# Patient Record
Sex: MALE | Race: WHITE | NOT HISPANIC OR LATINO
[De-identification: names, ages, dates, MRNs, and addresses within clinical notes are randomized per-mention and may not be internally consistent; named-entity substitution may affect disease eponyms.]

---

## 2017-02-01 ENCOUNTER — APPOINTMENT (OUTPATIENT)
Dept: INTERNAL MEDICINE | Facility: CLINIC | Age: 47
End: 2017-02-01

## 2017-02-07 ENCOUNTER — RESULT REVIEW (OUTPATIENT)
Age: 47
End: 2017-02-07

## 2017-02-08 ENCOUNTER — APPOINTMENT (OUTPATIENT)
Dept: DERMATOLOGY | Facility: CLINIC | Age: 47
End: 2017-02-08

## 2018-07-04 ENCOUNTER — TRANSCRIPTION ENCOUNTER (OUTPATIENT)
Age: 48
End: 2018-07-04

## 2020-07-09 ENCOUNTER — APPOINTMENT (OUTPATIENT)
Dept: INTERNAL MEDICINE | Facility: CLINIC | Age: 50
End: 2020-07-09
Payer: COMMERCIAL

## 2020-07-09 VITALS — HEART RATE: 89 BPM | WEIGHT: 272 LBS | BODY MASS INDEX: 40.75 KG/M2 | TEMPERATURE: 97.7 F | HEIGHT: 68.5 IN

## 2020-07-09 VITALS
SYSTOLIC BLOOD PRESSURE: 125 MMHG | DIASTOLIC BLOOD PRESSURE: 85 MMHG | HEART RATE: 89 BPM | TEMPERATURE: 97.7 F | WEIGHT: 272 LBS | BODY MASS INDEX: 40.76 KG/M2

## 2020-07-09 PROCEDURE — 99202 OFFICE O/P NEW SF 15 MIN: CPT

## 2020-07-21 ENCOUNTER — APPOINTMENT (OUTPATIENT)
Dept: POPULATION HEALTH | Facility: CLINIC | Age: 50
End: 2020-07-21
Payer: OTHER MISCELLANEOUS

## 2020-07-21 VITALS
DIASTOLIC BLOOD PRESSURE: 100 MMHG | WEIGHT: 272 LBS | BODY MASS INDEX: 41.22 KG/M2 | RESPIRATION RATE: 14 BRPM | HEIGHT: 68 IN | SYSTOLIC BLOOD PRESSURE: 139 MMHG | OXYGEN SATURATION: 98 % | TEMPERATURE: 98.4 F | HEART RATE: 68 BPM

## 2020-07-21 PROCEDURE — 99203 OFFICE O/P NEW LOW 30 MIN: CPT

## 2020-07-21 NOTE — ASSESSMENT
[FreeTextEntry1] : Recommend MRI left hip/pelvis to further evaluate, consider orthopedic evaluation/PT. Patient will return to office for physical as he is overdue\par \par Dr. Burch was present in office building while I examined patient\par

## 2020-07-21 NOTE — ADDENDUM
[FreeTextEntry1] : MRI of the pelvis/hip denied by insurance\par Changed to x-ray the left hip and pelvic sonogram\par \par Patient called on 7/20 stating he is now seeing a Worker's Comp. doctor who will handle testing

## 2020-07-21 NOTE — ASSESSMENT
[FreeTextEntry1] : 50 year old man with right low back pain and left iliopsoas strain after fall at work\par \par Plan:  conservative management with physical therapy \par pt does not want RX for pain management at this point in time.\par instructed pt in posture exercises and safe lifting techniques\par \par % impairment - 15% impairment - may work as tolerated when not having pain, using safe lifting techniques.  rest when having pain.\par

## 2020-07-21 NOTE — HISTORY OF PRESENT ILLNESS
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  He was pulling a skid full of about 30 boxes of tiles and he stepped backwards with his left leg and tripped on a wire - he felt his left groin pull out and he fell backwards, landing on the right side of his back.\par \par He had some pain in the left groin and right sdie of his back but resumed working and finished out the day.  The pain continued for about a week but then resolved.  He had no pain for about 2 weeks, but then the pain returned after working.  he has had intermittent pain on and off since that time, over the last several months.  \par \par Left groin - the pain comes and goes, usually walking is what triggers the pain, which improves with rest, alieve helps a bit.  does not radiate.  worse with walking.\par \par Back doesn’t hurt while working, but at the end of the day when he gets home he feels a stabbing pain 10/10 that goes on all night.  It feels better in the morning when he wakes up.  Pain is on the right side of lower back, does not radiate anywhere.\par \par Denies numbness or tingling in either leg\par \par Denies past history of any injuries.\par \par

## 2020-07-21 NOTE — HISTORY OF PRESENT ILLNESS
[FreeTextEntry8] : 50-year-old male presents with new/old to practice, last seen in 2016, no change in health/hospitalizations since last check here\par Medical history includes\par -Hypertension, dietarily controlled\par -Hyperlipidemia\par -Obesity\par -ARACELI on CPAP\par patient is on no medication\par \par Presents complaining of ?WCC.\par Patient states about 3 years ago in approximately November an object/material was falling at his job and he stopped it with his leg and jammed his left leg on a BX cable and did" a split". Patient states since then he has had left groin discomfort on and off and has had good days and bad days. Patient states" I feel as though hip is dislocating". Patient has never addressed this issue,has not had imaging done. Patient is taking Advil with muscle relaxer p.r.n. Patient is still awaiting to hear from his employer if this is a workman's comp case or not

## 2020-07-21 NOTE — PHYSICAL EXAM
[General Appearance - Alert] : alert [General Appearance - In No Acute Distress] : in no acute distress [Normal Kyphosis] : normal kyphosis [No Visual Abnormalities] : no visible abnormalities [No Scoliosis] : no scoliosis [Normal Lordosis] : normal lordosis [Full ROM] : full ROM [No Tenderness to Palpation] : no spine tenderness on palpation [No Masses] : no masses [No Pain with ROM] : no pain with motion in any direction [Intact] : all sensory within normal limits bilaterally [Shuffling] : shuffling [Motor Strength Lower Extremities Right] : strength was normal in the right lower extremity [Motor Strength Ankle Right Weakness] : normal ankle strength [Motor Strength Hips Right Weakness] : normal hip strength [5] : hip internal rotation 5/5 [Motor Strength Knee Left Weakness] : normal knee strength [4] : hip external rotation 4/5 [Motor Strength Ankle Left Weakness] : normal ankle strength [Skin Color & Pigmentation] : normal skin color and pigmentation [Skin Turgor] : normal skin turgor [Sensation] : the sensory exam was normal to light touch and pinprick [Motor Exam] : the motor exam was normal [] : no rash [No Focal Deficits] : no focal deficits

## 2020-07-21 NOTE — PHYSICAL EXAM
[No Respiratory Distress] : no respiratory distress  [No Acute Distress] : no acute distress [Normal Rate] : normal rate  [Clear to Auscultation] : lungs were clear to auscultation bilaterally [Regular Rhythm] : with a regular rhythm [Normal Affect] : the affect was normal [Normal Mood] : the mood was normal [de-identified] : +Discomfort left groin with movement of left leg especially straightening it, left hip with mild crepitus,+ pain with internal and external rotation, negative straight leg raise, no inguinal nodes/hernias noted

## 2020-08-19 ENCOUNTER — APPOINTMENT (OUTPATIENT)
Dept: POPULATION HEALTH | Facility: CLINIC | Age: 50
End: 2020-08-19
Payer: OTHER MISCELLANEOUS

## 2020-08-19 PROCEDURE — 99447 NTRPROF PH1/NTRNET/EHR 11-20: CPT

## 2020-08-19 NOTE — ASSESSMENT
[FreeTextEntry1] : 50 year old man with right low back pain and left iliopsoas strain after fall at work\par \par Plan: continue conservative management with physical therapy \par pt does not want RX for pain management at this point in time.\par instructed pt in posture exercises and safe lifting techniques\par \par % impairment - 15% impairment - may work as tolerated when not having pain, using safe lifting techniques. rest when having pain.\par

## 2020-08-19 NOTE — HISTORY OF PRESENT ILLNESS
[FreeTextEntry1] : 50 year old man here for evaluation of left groin pain and right lower back pain.\par \par DOI: 1/3/2020\par \par INTERVAL HISTORY:\par Feeling a bit better - groin hurts sometimes but not as much as before\par back is only painful about once a week, as opposed to every day.\par \par Has done PT for about a month and its helping.\par does heat therapy, passive stretching; also doing stretches at home.\par also doing strengthening exercises in the weight room. \par

## 2020-08-19 NOTE — REASON FOR VISIT
[Other Location: e.g. School (Enter Location, City,State)___] : at [unfilled], at the time of the visit. [Medical Office: ([facility])___] : at the medical office located in  [Verbal consent obtained from patient] : the patient, [unfilled]

## 2020-09-22 ENCOUNTER — APPOINTMENT (OUTPATIENT)
Dept: POPULATION HEALTH | Facility: CLINIC | Age: 50
End: 2020-09-22
Payer: OTHER MISCELLANEOUS

## 2020-09-22 VITALS
SYSTOLIC BLOOD PRESSURE: 134 MMHG | HEART RATE: 80 BPM | TEMPERATURE: 95.9 F | WEIGHT: 272 LBS | DIASTOLIC BLOOD PRESSURE: 80 MMHG | HEIGHT: 68 IN | BODY MASS INDEX: 41.22 KG/M2 | OXYGEN SATURATION: 98 %

## 2020-09-22 DIAGNOSIS — R10.32 LEFT LOWER QUADRANT PAIN: ICD-10-CM

## 2020-09-22 PROCEDURE — 99213 OFFICE O/P EST LOW 20 MIN: CPT

## 2020-09-22 NOTE — HISTORY OF PRESENT ILLNESS
[FreeTextEntry1] : 50 year old man here for evaluation of left groin pain and right lower back pain.\par \par DOI: 1/3/2020\par \par INTERVAL HISTORY:\par Pain is completely resolved.\par Finished PT about a week and a half ago, still doing exercises and stretches at home.\par Feeling great, has no complaints

## 2020-09-22 NOTE — PHYSICAL EXAM
[General Appearance - Alert] : alert [General Appearance - In No Acute Distress] : in no acute distress [Normal Kyphosis] : normal kyphosis [No Visual Abnormalities] : no visible abnormalities [Normal Lordosis] : normal lordosis [No Scoliosis] : no scoliosis [No Tenderness to Palpation] : no spine tenderness on palpation [No Masses] : no masses [Full ROM] : full ROM [No Pain with ROM] : no pain with motion in any direction [Intact] : all reflexes within normal limits bilaterally [Normal Station and Gait] : the gait and station were normal [Normal] : motor strength was normal in all muscle groups [Normal Motor Tone] : the muscle tone was normal [Involuntary Movements] : no involuntary movements were seen

## 2020-09-22 NOTE — ASSESSMENT
[FreeTextEntry1] : 49 yo man with back and groin pain, fully resolved after successful course of PT.\par \par Plan: continue exercise and stretching\par counselled on daily exercise and resistance training twice a week to maintain fitness\par \par 0% impairment\par may return to work with no restrictions

## 2021-08-03 ENCOUNTER — APPOINTMENT (OUTPATIENT)
Dept: INTERNAL MEDICINE | Facility: CLINIC | Age: 51
End: 2021-08-03
Payer: COMMERCIAL

## 2021-08-03 VITALS — SYSTOLIC BLOOD PRESSURE: 145 MMHG | DIASTOLIC BLOOD PRESSURE: 94 MMHG

## 2021-08-03 VITALS
RESPIRATION RATE: 14 BRPM | HEIGHT: 68 IN | WEIGHT: 277 LBS | BODY MASS INDEX: 41.98 KG/M2 | TEMPERATURE: 98 F | SYSTOLIC BLOOD PRESSURE: 150 MMHG | HEART RATE: 83 BPM | DIASTOLIC BLOOD PRESSURE: 95 MMHG

## 2021-08-03 PROCEDURE — 36415 COLL VENOUS BLD VENIPUNCTURE: CPT

## 2021-08-03 PROCEDURE — 99214 OFFICE O/P EST MOD 30 MIN: CPT | Mod: 25

## 2021-08-03 NOTE — ASSESSMENT
[FreeTextEntry1] : Patient with long history of hypertension which has not been treated with patient declining stating he would ttempt to treat with lifestyle changes which have not occurred.\par \par patient's blood pressure now higher and strongly recommended he be treated which she reluctantly agrees to do.\par Therefore start olmesartan 20 mg daily.\par \par Patient also with history of sleep apnea which is untreated therefore given referral to followup with sleep specialists.\par \par Venipuncture done today in the office to check metabolic status.\par \par followup in 3 weeks to recheck blood pressure and BMP.\par Also schedule appointment for 3 months for yearly physical.\par \par followup with Worker's Comp. physician for continued groin pain.

## 2021-08-03 NOTE — HISTORY OF PRESENT ILLNESS
[FreeTextEntry8] : The patient scheduled an acute appointment secondary to left groin pain which she has had an is a Worker's Comp. case therefore patient told he needs to followup with his Worker's Comp. physician.\par \par The patient does have a history of hypertension which he was attempting to control with lifestyle changes which have not occurred.\par The patient was last seen about a year ago with mildly elevated blood pressure but he never followed up.\par No chest pain or shortness of breath.

## 2021-08-03 NOTE — PHYSICAL EXAM
[No Acute Distress] : no acute distress [Well-Appearing] : well-appearing [No Respiratory Distress] : no respiratory distress  [No Accessory Muscle Use] : no accessory muscle use [Clear to Auscultation] : lungs were clear to auscultation bilaterally [Normal Rate] : normal rate  [Regular Rhythm] : with a regular rhythm [No Edema] : there was no peripheral edema [No Focal Deficits] : no focal deficits [Normal Affect] : the affect was normal

## 2021-08-04 ENCOUNTER — NON-APPOINTMENT (OUTPATIENT)
Age: 51
End: 2021-08-04

## 2021-08-04 LAB
ALBUMIN SERPL ELPH-MCNC: 4.7 G/DL
ALP BLD-CCNC: 155 U/L
ALT SERPL-CCNC: 27 U/L
ANION GAP SERPL CALC-SCNC: 14 MMOL/L
AST SERPL-CCNC: 17 U/L
BASOPHILS # BLD AUTO: 0.04 K/UL
BASOPHILS NFR BLD AUTO: 0.4 %
BILIRUB SERPL-MCNC: 0.4 MG/DL
BUN SERPL-MCNC: 15 MG/DL
CALCIUM SERPL-MCNC: 9.8 MG/DL
CHLORIDE SERPL-SCNC: 101 MMOL/L
CHOLEST SERPL-MCNC: 183 MG/DL
CO2 SERPL-SCNC: 26 MMOL/L
CREAT SERPL-MCNC: 0.94 MG/DL
EOSINOPHIL # BLD AUTO: 0.26 K/UL
EOSINOPHIL NFR BLD AUTO: 2.6 %
ESTIMATED AVERAGE GLUCOSE: 183 MG/DL
GLUCOSE SERPL-MCNC: 173 MG/DL
HBA1C MFR BLD HPLC: 8 %
HCT VFR BLD CALC: 47.6 %
HDLC SERPL-MCNC: 48 MG/DL
HGB BLD-MCNC: 15.6 G/DL
IMM GRANULOCYTES NFR BLD AUTO: 0.1 %
LDLC SERPL CALC-MCNC: 109 MG/DL
LYMPHOCYTES # BLD AUTO: 2.3 K/UL
LYMPHOCYTES NFR BLD AUTO: 23.4 %
MAN DIFF?: NORMAL
MCHC RBC-ENTMCNC: 28 PG
MCHC RBC-ENTMCNC: 32.8 GM/DL
MCV RBC AUTO: 85.3 FL
MONOCYTES # BLD AUTO: 0.68 K/UL
MONOCYTES NFR BLD AUTO: 6.9 %
NEUTROPHILS # BLD AUTO: 6.56 K/UL
NEUTROPHILS NFR BLD AUTO: 66.6 %
NONHDLC SERPL-MCNC: 135 MG/DL
PLATELET # BLD AUTO: 232 K/UL
POTASSIUM SERPL-SCNC: 4.9 MMOL/L
PROT SERPL-MCNC: 7.4 G/DL
RBC # BLD: 5.58 M/UL
RBC # FLD: 13.3 %
SODIUM SERPL-SCNC: 141 MMOL/L
TRIGL SERPL-MCNC: 127 MG/DL
TSH SERPL-ACNC: 2.84 UIU/ML
WBC # FLD AUTO: 9.85 K/UL

## 2021-08-09 ENCOUNTER — NON-APPOINTMENT (OUTPATIENT)
Age: 51
End: 2021-08-09

## 2021-08-09 ENCOUNTER — APPOINTMENT (OUTPATIENT)
Dept: PULMONOLOGY | Facility: CLINIC | Age: 51
End: 2021-08-09
Payer: COMMERCIAL

## 2021-08-09 VITALS
HEIGHT: 60 IN | HEART RATE: 88 BPM | DIASTOLIC BLOOD PRESSURE: 80 MMHG | RESPIRATION RATE: 16 BRPM | BODY MASS INDEX: 54.38 KG/M2 | SYSTOLIC BLOOD PRESSURE: 120 MMHG | WEIGHT: 277 LBS | OXYGEN SATURATION: 99 %

## 2021-08-09 PROCEDURE — 99204 OFFICE O/P NEW MOD 45 MIN: CPT

## 2021-08-09 NOTE — HISTORY OF PRESENT ILLNESS
[TextBox_4] : The patient was last seen in this office about 6 years ago. He has a history of obstructive sleep apnea diagnosed in 2013 2014. Even CPAP but did not do well with that. He's gained about 20 pounds since the diagnosis. He continues to report excessive sleepiness during the daytime. Loud snoring was noted. [ESS] : 7

## 2021-08-09 NOTE — ASSESSMENT
[FreeTextEntry1] : [place] with a remote history of obstructive sleep apnea probably persistent. I explained him that there have been advances in CPAP technology, mask fitting, humidification. A repeat home sleep study has been ordered. I will see him back after that and order new equipment for him.

## 2021-08-09 NOTE — PHYSICAL EXAM
[Low Lying Soft Palate] : low lying soft palate [Elongated Uvula] : elongated uvula [Enlarged Base of the Tongue] : enlarged base of the tongue [III] : Mallampati Class: III [Neck Circumference: ___] : neck circumference: [unfilled] [Normal Rate/Rhythm] : normal rate/rhythm [Normal S1, S2] : normal s1, s2 [No Resp Distress] : no resp distress [Clear to Auscultation Bilaterally] : clear to auscultation bilaterally [TextBox_2] : Obese

## 2021-08-22 ENCOUNTER — OUTPATIENT (OUTPATIENT)
Dept: OUTPATIENT SERVICES | Facility: HOSPITAL | Age: 51
LOS: 1 days | End: 2021-08-22
Payer: COMMERCIAL

## 2021-08-22 ENCOUNTER — APPOINTMENT (OUTPATIENT)
Age: 51
End: 2021-08-22
Payer: COMMERCIAL

## 2021-08-22 DIAGNOSIS — G47.33 OBSTRUCTIVE SLEEP APNEA (ADULT) (PEDIATRIC): ICD-10-CM

## 2021-08-22 PROCEDURE — 95806 SLEEP STUDY UNATT&RESP EFFT: CPT | Mod: 26

## 2021-08-22 PROCEDURE — 95806 SLEEP STUDY UNATT&RESP EFFT: CPT

## 2021-08-31 ENCOUNTER — APPOINTMENT (OUTPATIENT)
Dept: INTERNAL MEDICINE | Facility: CLINIC | Age: 51
End: 2021-08-31
Payer: COMMERCIAL

## 2021-08-31 VITALS
HEART RATE: 80 BPM | RESPIRATION RATE: 14 BRPM | SYSTOLIC BLOOD PRESSURE: 115 MMHG | DIASTOLIC BLOOD PRESSURE: 70 MMHG | HEIGHT: 68 IN | TEMPERATURE: 97.4 F | WEIGHT: 260 LBS | BODY MASS INDEX: 39.4 KG/M2

## 2021-08-31 DIAGNOSIS — Z80.0 FAMILY HISTORY OF MALIGNANT NEOPLASM OF DIGESTIVE ORGANS: ICD-10-CM

## 2021-08-31 PROCEDURE — 99213 OFFICE O/P EST LOW 20 MIN: CPT | Mod: 25

## 2021-08-31 PROCEDURE — 36415 COLL VENOUS BLD VENIPUNCTURE: CPT

## 2021-08-31 RX ORDER — LATANOPROST/PF 0.005 %
0.01 DROPS OPHTHALMIC (EYE)
Qty: 2 | Refills: 0 | Status: ACTIVE | COMMUNITY
Start: 2021-01-07

## 2021-08-31 NOTE — ASSESSMENT
[FreeTextEntry1] : Hypertension with good control with olmesartan 20 mg daily therefore will continue.\par No side effects.\par \par Discussed hyperglycemia with hemoglobin A1c at 8 with positive family history diabetes therefore patient likely a type II diabetic.\par He has already started lifestyle changes with improved diet and 17 pounds of weight loss.\par Encouraged to continue.\par \par Last blood work also showed mildly elevated alkaline phosphatase therefore will be repeated.\par \par Venipuncture done today in the office\par Followup in 3 months

## 2021-08-31 NOTE — HISTORY OF PRESENT ILLNESS
[FreeTextEntry1] : Followup hypertension new on medication and abnormal blood work [de-identified] : The patient presents for a followup visit having been seen on August 3, 2021 with elevated blood pressure therefore started treatment with olmesartan 20 mg daily.\par The patient is taking it without any side effects.\par \par Also blood work on that visit showed mild hyperlipidemia but hyperglycemia with hemoglobin A1c at 8. Therefore patient likely a type II diabetic.\par Family history includes father having had diabetes.\par Since his last visit the patient has made some excellent lifestyle changes mainly with dietary changes and has lost 17 pounds to

## 2021-09-09 LAB
ALBUMIN SERPL ELPH-MCNC: 4.9 G/DL
ALP BLD-CCNC: 98 U/L
ALT SERPL-CCNC: 49 U/L
ANION GAP SERPL CALC-SCNC: 13 MMOL/L
AST SERPL-CCNC: 30 U/L
BILIRUB DIRECT SERPL-MCNC: 0.1 MG/DL
BILIRUB INDIRECT SERPL-MCNC: 0.3 MG/DL
BILIRUB SERPL-MCNC: 0.4 MG/DL
BUN SERPL-MCNC: 20 MG/DL
CALCIUM SERPL-MCNC: 9.9 MG/DL
CHLORIDE SERPL-SCNC: 101 MMOL/L
CO2 SERPL-SCNC: 25 MMOL/L
CREAT SERPL-MCNC: 1.37 MG/DL
ESTIMATED AVERAGE GLUCOSE: 177 MG/DL
GLUCOSE SERPL-MCNC: 94 MG/DL
HBA1C MFR BLD HPLC: 7.8 %
POTASSIUM SERPL-SCNC: 4.6 MMOL/L
PROT SERPL-MCNC: 7.6 G/DL
SODIUM SERPL-SCNC: 139 MMOL/L

## 2021-09-15 LAB
ALBUMIN SERPL ELPH-MCNC: 4.7 G/DL
ALP BLD-CCNC: 104 U/L
ALT SERPL-CCNC: 28 U/L
ANION GAP SERPL CALC-SCNC: 18 MMOL/L
AST SERPL-CCNC: 20 U/L
BILIRUB DIRECT SERPL-MCNC: 0.2 MG/DL
BILIRUB INDIRECT SERPL-MCNC: 0.3 MG/DL
BILIRUB SERPL-MCNC: 0.4 MG/DL
BUN SERPL-MCNC: 15 MG/DL
CALCIUM SERPL-MCNC: 9.4 MG/DL
CHLORIDE SERPL-SCNC: 98 MMOL/L
CO2 SERPL-SCNC: 24 MMOL/L
CREAT SERPL-MCNC: 1.01 MG/DL
GLUCOSE SERPL-MCNC: 108 MG/DL
POTASSIUM SERPL-SCNC: 4.2 MMOL/L
PROT SERPL-MCNC: 7.1 G/DL
SODIUM SERPL-SCNC: 140 MMOL/L

## 2021-09-21 ENCOUNTER — NON-APPOINTMENT (OUTPATIENT)
Age: 51
End: 2021-09-21

## 2021-09-22 ENCOUNTER — NON-APPOINTMENT (OUTPATIENT)
Age: 51
End: 2021-09-22

## 2021-10-05 ENCOUNTER — APPOINTMENT (OUTPATIENT)
Dept: PULMONOLOGY | Facility: CLINIC | Age: 51
End: 2021-10-05
Payer: COMMERCIAL

## 2021-10-05 VITALS
BODY MASS INDEX: 38.62 KG/M2 | HEART RATE: 70 BPM | DIASTOLIC BLOOD PRESSURE: 68 MMHG | SYSTOLIC BLOOD PRESSURE: 120 MMHG | OXYGEN SATURATION: 97 % | WEIGHT: 254 LBS

## 2021-10-05 PROCEDURE — 99214 OFFICE O/P EST MOD 30 MIN: CPT

## 2021-10-05 NOTE — HISTORY OF PRESENT ILLNESS
[Obstructive Sleep Apnea] : obstructive sleep apnea [Home] : home [TextBox_100] : 8/21 [TextBox_108] : 71 [TextBox_112] : 83 [TextBox_116] : 74 [TextBox_165] : I reviewed the patient's sleep study with the patient.\par

## 2021-10-05 NOTE — ASSESSMENT
[FreeTextEntry1] : Patient with severe obstructive sleep apnea.AutoPAP will be ordered for the patient at 6 to 18 cm H2O.  The patient was instructed to begin wearing it with the goal being all night, every night.  Minimum acceptable use parameters were discussed with the patient.  Followup will be in 2-3 months to evaluate compliance and efficacy, and address any problems the patient may have with APAP.\par

## 2021-11-15 ENCOUNTER — NON-APPOINTMENT (OUTPATIENT)
Age: 51
End: 2021-11-15

## 2021-11-15 ENCOUNTER — APPOINTMENT (OUTPATIENT)
Dept: INTERNAL MEDICINE | Facility: CLINIC | Age: 51
End: 2021-11-15
Payer: COMMERCIAL

## 2021-11-15 VITALS
DIASTOLIC BLOOD PRESSURE: 80 MMHG | HEART RATE: 73 BPM | HEIGHT: 68 IN | WEIGHT: 252 LBS | TEMPERATURE: 97.4 F | BODY MASS INDEX: 38.19 KG/M2 | SYSTOLIC BLOOD PRESSURE: 122 MMHG | RESPIRATION RATE: 16 BRPM

## 2021-11-15 DIAGNOSIS — Z86.79 PERSONAL HISTORY OF OTHER DISEASES OF THE CIRCULATORY SYSTEM: ICD-10-CM

## 2021-11-15 DIAGNOSIS — R74.8 ABNORMAL LEVELS OF OTHER SERUM ENZYMES: ICD-10-CM

## 2021-11-15 DIAGNOSIS — R79.89 OTHER SPECIFIED ABNORMAL FINDINGS OF BLOOD CHEMISTRY: ICD-10-CM

## 2021-11-15 DIAGNOSIS — Z99.89 OBSTRUCTIVE SLEEP APNEA (ADULT) (PEDIATRIC): ICD-10-CM

## 2021-11-15 DIAGNOSIS — Z92.29 PERSONAL HISTORY OF OTHER DRUG THERAPY: ICD-10-CM

## 2021-11-15 DIAGNOSIS — Z13.39 ENCOUNTER FOR SCREENING EXAM FOR OTHER MENTAL HEALTH AND BEHAVIORAL DISORDERS: ICD-10-CM

## 2021-11-15 DIAGNOSIS — Z86.39 PERSONAL HISTORY OF OTHER ENDOCRINE, NUTRITIONAL AND METABOLIC DISEASE: ICD-10-CM

## 2021-11-15 DIAGNOSIS — Z13.31 ENCOUNTER FOR SCREENING FOR DEPRESSION: ICD-10-CM

## 2021-11-15 DIAGNOSIS — G47.33 OBSTRUCTIVE SLEEP APNEA (ADULT) (PEDIATRIC): ICD-10-CM

## 2021-11-15 PROCEDURE — G0442 ANNUAL ALCOHOL SCREEN 15 MIN: CPT

## 2021-11-15 PROCEDURE — 36415 COLL VENOUS BLD VENIPUNCTURE: CPT

## 2021-11-15 PROCEDURE — 93000 ELECTROCARDIOGRAM COMPLETE: CPT | Mod: 59

## 2021-11-15 PROCEDURE — 99396 PREV VISIT EST AGE 40-64: CPT | Mod: 25

## 2021-11-15 PROCEDURE — G0444 DEPRESSION SCREEN ANNUAL: CPT | Mod: 59

## 2021-11-15 NOTE — HISTORY OF PRESENT ILLNESS
[de-identified] : 51-year-old male presents for his overdue yearly physical. His last physical was in 2015.\par \par He does have a history of hypertension when she was trying to control with lifestyle changes.\par He followed of August 2021 with uncontrolled hypertension if almost on 20 mg daily started which he has tolerated with good blood pressure control.\par \par Also blood work at that time showed hemoglobin A1c at 8 with followup 3 weeks later at 7.8 therefore patient is a type II diabetic. 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 He now has plateaued.\par \par Also a history of sleep apnea for which he recently was reevaluated by sleep specialist with positive ARACELI and he is waiting for his CPAP.\par \par Overall he feels well without complaints including no chest pain, palpitations, shortness of breath or edema.\par \par He is a nonsmoker and minimally drinks alcohol.\par He is  with no children.\par He does physical work in construction.

## 2021-11-15 NOTE — PHYSICAL EXAM
[No Acute Distress] : no acute distress [Well Nourished] : well nourished [Well Developed] : well developed [Well-Appearing] : well-appearing [Normal Sclera/Conjunctiva] : normal sclera/conjunctiva [PERRL] : pupils equal round and reactive to light [EOMI] : extraocular movements intact [Normal Outer Ear/Nose] : the outer ears and nose were normal in appearance [Normal Oropharynx] : the oropharynx was normal [No JVD] : no jugular venous distention [No Lymphadenopathy] : no lymphadenopathy [Supple] : supple [Thyroid Normal, No Nodules] : the thyroid was normal and there were no nodules present [No Respiratory Distress] : no respiratory distress  [No Accessory Muscle Use] : no accessory muscle use [Clear to Auscultation] : lungs were clear to auscultation bilaterally [Normal Rate] : normal rate  [Regular Rhythm] : with a regular rhythm [Normal S1, S2] : normal S1 and S2 [No Murmur] : no murmur heard [No Carotid Bruits] : no carotid bruits [No Abdominal Bruit] : a ~M bruit was not heard ~T in the abdomen [No Varicosities] : no varicosities [Pedal Pulses Present] : the pedal pulses are present [No Edema] : there was no peripheral edema [No Palpable Aorta] : no palpable aorta [No Extremity Clubbing/Cyanosis] : no extremity clubbing/cyanosis [Soft] : abdomen soft [Non Tender] : non-tender [Non-distended] : non-distended [No Masses] : no abdominal mass palpated [No HSM] : no HSM [Normal Bowel Sounds] : normal bowel sounds [Normal Posterior Cervical Nodes] : no posterior cervical lymphadenopathy [Normal Anterior Cervical Nodes] : no anterior cervical lymphadenopathy [No CVA Tenderness] : no CVA  tenderness [No Spinal Tenderness] : no spinal tenderness [No Joint Swelling] : no joint swelling [Grossly Normal Strength/Tone] : grossly normal strength/tone [No Rash] : no rash [Coordination Grossly Intact] : coordination grossly intact [No Focal Deficits] : no focal deficits [Normal Gait] : normal gait [Deep Tendon Reflexes (DTR)] : deep tendon reflexes were 2+ and symmetric [Normal Affect] : the affect was normal [Normal Insight/Judgement] : insight and judgment were intact [Rectal Exam - Rectum] : digital rectal exam was normal [Prostate Enlargement] : the prostate was not enlarged [Prostate Tenderness] : the prostate was not tender [No Prostate Nodules] : no prostate nodules [Right Foot Was Examined] : Right foot ~C was examined [Left Foot Was Examined] : left foot ~C was examined [None] : no ulcers in either foot were found [] : both feet [de-identified] : "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

## 2021-11-15 NOTE — HEALTH RISK ASSESSMENT
[Good] : ~his/her~ current health as good [Very Good] : ~his/her~  mood as very good [Yes] : Yes [2 - 4 times a month (2 pts)] : 2-4 times a month (2 points) [1 or 2 (0 pts)] : 1 or 2 (0 points) [Never (0 pts)] : Never (0 points) [No] : In the past 12 months have you used drugs other than those required for medical reasons? No [No falls in past year] : Patient reported no falls in the past year [0] : 2) Feeling down, depressed, or hopeless: Not at all (0) [PHQ-2 Negative - No further assessment needed] : PHQ-2 Negative - No further assessment needed [None] : None [With Significant Other] : lives with significant other [Employed] : employed [High School] : high school [] :  [# Of Children ___] : has [unfilled] children [Fully functional (bathing, dressing, toileting, transferring, walking, feeding)] : Fully functional (bathing, dressing, toileting, transferring, walking, feeding) [Fully functional (using the telephone, shopping, preparing meals, housekeeping, doing laundry, using] : Fully functional and needs no help or supervision to perform IADLs (using the telephone, shopping, preparing meals, housekeeping, doing laundry, using transportation, managing medications and managing finances) [Smoke Detector] : smoke detector [Carbon Monoxide Detector] : carbon monoxide detector [Seat Belt] :  uses seat belt [Sunscreen] : uses sunscreen [Reviewed no changes] : Reviewed, no changes [Designated Healthcare Proxy] : Designated healthcare proxy [Name: ___] : Health Care Proxy's Name: [unfilled]  [] : No [de-identified] : active [Audit-CScore] : 2 [de-identified] : improved [WBS3Nclfl] : 0 [No Retinopathy] : No retinopathy [EyeExamDate] : 09/21 [Change in mental status noted] : No change in mental status noted [Reports changes in hearing] : Reports no changes in hearing [Reports changes in vision] : Reports no changes in vision [Reports changes in dental health] : Reports no changes in dental health [FreeTextEntry2] : Construction [AdvancecareDate] : 11/21

## 2021-11-15 NOTE — ASSESSMENT
[FreeTextEntry1] : 51-year-old male with a long history of a obesity having made good lifestyle changes mainly with diet with 25 pound weight loss. Patient encouraged to continue but improved and at exercise.\par \par Long history of hypertension not controlled with olmesartan therefore will continue.\par \par New diagnosis of type 2 diabetes with patient having a good lifestyle changes would likely improvement. Hopefully can avoid medications.\par \par Mild hyperlipidemia but likely will start statin therapy with patient being a diabetic. We'll decide after this lipid profile is reviewed.\par \par Colonoscopy has never had therefore referral given to gastroenterology\par \par Declines influenza vaccine\par Received the COVID vaccine\par \par Venipuncture done today in the office\par Followup in 3 months

## 2021-11-17 ENCOUNTER — NON-APPOINTMENT (OUTPATIENT)
Age: 51
End: 2021-11-17

## 2021-11-17 LAB
ALBUMIN SERPL ELPH-MCNC: 4.7 G/DL
ALP BLD-CCNC: 99 U/L
ALT SERPL-CCNC: 26 U/L
ANION GAP SERPL CALC-SCNC: 16 MMOL/L
APPEARANCE: CLEAR
AST SERPL-CCNC: 20 U/L
BACTERIA: NEGATIVE
BASOPHILS # BLD AUTO: 0.05 K/UL
BASOPHILS NFR BLD AUTO: 0.4 %
BILIRUB SERPL-MCNC: 0.6 MG/DL
BILIRUBIN URINE: NEGATIVE
BLOOD URINE: NEGATIVE
BUN SERPL-MCNC: 13 MG/DL
CALCIUM SERPL-MCNC: 9.8 MG/DL
CHLORIDE SERPL-SCNC: 100 MMOL/L
CHOLEST SERPL-MCNC: 183 MG/DL
CO2 SERPL-SCNC: 24 MMOL/L
COLOR: YELLOW
CREAT SERPL-MCNC: 1.03 MG/DL
CREAT SPEC-SCNC: 203 MG/DL
EOSINOPHIL # BLD AUTO: 0.21 K/UL
EOSINOPHIL NFR BLD AUTO: 1.9 %
ESTIMATED AVERAGE GLUCOSE: 126 MG/DL
GLUCOSE QUALITATIVE U: NEGATIVE
GLUCOSE SERPL-MCNC: 91 MG/DL
HBA1C MFR BLD HPLC: 6 %
HCT VFR BLD CALC: 47.1 %
HDLC SERPL-MCNC: 51 MG/DL
HGB BLD-MCNC: 15.3 G/DL
HYALINE CASTS: 0 /LPF
IMM GRANULOCYTES NFR BLD AUTO: 0.2 %
KETONES URINE: NORMAL
LDLC SERPL CALC-MCNC: 118 MG/DL
LEUKOCYTE ESTERASE URINE: NEGATIVE
LYMPHOCYTES # BLD AUTO: 2.48 K/UL
LYMPHOCYTES NFR BLD AUTO: 22.1 %
MAN DIFF?: NORMAL
MCHC RBC-ENTMCNC: 28.7 PG
MCHC RBC-ENTMCNC: 32.5 GM/DL
MCV RBC AUTO: 88.2 FL
MICROALBUMIN 24H UR DL<=1MG/L-MCNC: <1.2 MG/DL
MICROALBUMIN/CREAT 24H UR-RTO: NORMAL MG/G
MICROSCOPIC-UA: NORMAL
MONOCYTES # BLD AUTO: 0.75 K/UL
MONOCYTES NFR BLD AUTO: 6.7 %
NEUTROPHILS # BLD AUTO: 7.72 K/UL
NEUTROPHILS NFR BLD AUTO: 68.7 %
NITRITE URINE: NEGATIVE
NONHDLC SERPL-MCNC: 133 MG/DL
PH URINE: 5.5
PLATELET # BLD AUTO: 228 K/UL
POTASSIUM SERPL-SCNC: 4.9 MMOL/L
PROT SERPL-MCNC: 7.4 G/DL
PROTEIN URINE: NEGATIVE
PSA SERPL-MCNC: 0.54 NG/ML
RBC # BLD: 5.34 M/UL
RBC # FLD: 13.4 %
RED BLOOD CELLS URINE: 0 /HPF
SODIUM SERPL-SCNC: 139 MMOL/L
SPECIFIC GRAVITY URINE: 1.02
SQUAMOUS EPITHELIAL CELLS: 0 /HPF
TRIGL SERPL-MCNC: 76 MG/DL
UROBILINOGEN URINE: NORMAL
WBC # FLD AUTO: 11.23 K/UL
WHITE BLOOD CELLS URINE: 0 /HPF

## 2021-12-02 ENCOUNTER — NON-APPOINTMENT (OUTPATIENT)
Age: 51
End: 2021-12-02

## 2022-01-19 ENCOUNTER — NON-APPOINTMENT (OUTPATIENT)
Age: 52
End: 2022-01-19

## 2022-02-24 ENCOUNTER — APPOINTMENT (OUTPATIENT)
Dept: INTERNAL MEDICINE | Facility: CLINIC | Age: 52
End: 2022-02-24
Payer: COMMERCIAL

## 2022-02-24 VITALS
DIASTOLIC BLOOD PRESSURE: 85 MMHG | HEIGHT: 68 IN | SYSTOLIC BLOOD PRESSURE: 120 MMHG | RESPIRATION RATE: 13 BRPM | OXYGEN SATURATION: 97 % | HEART RATE: 80 BPM | BODY MASS INDEX: 39.1 KG/M2 | TEMPERATURE: 97.2 F | WEIGHT: 258 LBS

## 2022-02-24 DIAGNOSIS — E11.65 TYPE 2 DIABETES MELLITUS WITH HYPERGLYCEMIA: ICD-10-CM

## 2022-02-24 PROCEDURE — 99214 OFFICE O/P EST MOD 30 MIN: CPT | Mod: 25

## 2022-02-24 PROCEDURE — 36415 COLL VENOUS BLD VENIPUNCTURE: CPT

## 2022-02-24 NOTE — PHYSICAL EXAM
[No Acute Distress] : no acute distress [No Respiratory Distress] : no respiratory distress  [Clear to Auscultation] : lungs were clear to auscultation bilaterally [Regular Rhythm] : with a regular rhythm [Normal Rate] : normal rate  [Normal Affect] : the affect was normal [Normal Mood] : the mood was normal

## 2022-02-25 LAB
ALBUMIN SERPL ELPH-MCNC: 4.9 G/DL
ALP BLD-CCNC: 102 U/L
ALT SERPL-CCNC: 29 U/L
ANION GAP SERPL CALC-SCNC: 13 MMOL/L
AST SERPL-CCNC: 21 U/L
BASOPHILS # BLD AUTO: 0.04 K/UL
BASOPHILS NFR BLD AUTO: 0.4 %
BILIRUB SERPL-MCNC: 0.6 MG/DL
BUN SERPL-MCNC: 19 MG/DL
CALCIUM SERPL-MCNC: 10.2 MG/DL
CHLORIDE SERPL-SCNC: 99 MMOL/L
CHOLEST SERPL-MCNC: 191 MG/DL
CO2 SERPL-SCNC: 27 MMOL/L
CREAT SERPL-MCNC: 1.15 MG/DL
EOSINOPHIL # BLD AUTO: 0.19 K/UL
EOSINOPHIL NFR BLD AUTO: 1.8 %
ESTIMATED AVERAGE GLUCOSE: 148 MG/DL
GLUCOSE SERPL-MCNC: 97 MG/DL
HBA1C MFR BLD HPLC: 6.8 %
HCT VFR BLD CALC: 47.6 %
HDLC SERPL-MCNC: 60 MG/DL
HGB BLD-MCNC: 15.5 G/DL
IMM GRANULOCYTES NFR BLD AUTO: 0.2 %
LDLC SERPL CALC-MCNC: 116 MG/DL
LYMPHOCYTES # BLD AUTO: 2.55 K/UL
LYMPHOCYTES NFR BLD AUTO: 23.9 %
MAN DIFF?: NORMAL
MCHC RBC-ENTMCNC: 28.2 PG
MCHC RBC-ENTMCNC: 32.6 GM/DL
MCV RBC AUTO: 86.7 FL
MONOCYTES # BLD AUTO: 0.75 K/UL
MONOCYTES NFR BLD AUTO: 7 %
NEUTROPHILS # BLD AUTO: 7.13 K/UL
NEUTROPHILS NFR BLD AUTO: 66.7 %
NONHDLC SERPL-MCNC: 131 MG/DL
PLATELET # BLD AUTO: 241 K/UL
POTASSIUM SERPL-SCNC: 4.8 MMOL/L
PROT SERPL-MCNC: 7.4 G/DL
RBC # BLD: 5.49 M/UL
RBC # FLD: 13.3 %
SODIUM SERPL-SCNC: 140 MMOL/L
TRIGL SERPL-MCNC: 74 MG/DL
WBC # FLD AUTO: 10.68 K/UL

## 2022-02-25 NOTE — ASSESSMENT
[FreeTextEntry1] : Venipuncture done in our office, Labs sent out. Patient advised to continue present medications with diet/exercise and specialists followup. Patient  will return to the office in 3-4 months\par \par \par \par Colonoscopy=had 1/2022=to call for report\par Declines flu shot, +got covid vaccine X2, declines booster\par Shingrix d/w pt=declines\par MA 11/2021\par MD's\par 1.Cardio-Dr. Carreno\par 2.EYE MD?MD name-in [place]\par 3.Pulmonary-Dr. Gonzalez

## 2022-02-25 NOTE — HISTORY OF PRESENT ILLNESS
[FreeTextEntry1] : Followup [de-identified] : Pt presents for followup\par -HTN on Benicar\par -hyperlipidemia, declines statin\par -T2DM, managing dietarily\par -+severe ARACELI, waiting on CPAP\par -got covid vaccines X2\par -no new issues

## 2022-03-01 ENCOUNTER — APPOINTMENT (OUTPATIENT)
Dept: POPULATION HEALTH | Facility: CLINIC | Age: 52
End: 2022-03-01
Payer: OTHER MISCELLANEOUS

## 2022-03-01 ENCOUNTER — NON-APPOINTMENT (OUTPATIENT)
Age: 52
End: 2022-03-01

## 2022-03-01 VITALS
OXYGEN SATURATION: 97 % | DIASTOLIC BLOOD PRESSURE: 70 MMHG | TEMPERATURE: 98 F | SYSTOLIC BLOOD PRESSURE: 110 MMHG | HEIGHT: 68 IN | HEART RATE: 81 BPM | BODY MASS INDEX: 39.1 KG/M2 | WEIGHT: 258 LBS

## 2022-03-01 PROCEDURE — 99213 OFFICE O/P EST LOW 20 MIN: CPT

## 2022-03-01 PROCEDURE — 99072 ADDL SUPL MATRL&STAF TM PHE: CPT

## 2022-03-01 NOTE — PLAN
[FreeTextEntry1] : referred to physical medicine/orthopedic for further evaluation to decide necessary testing and treatment. patient states there is no modified duty at work and he cannot take off work. will defer further management to physical medicine/orthopedic.  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  [FreeTextEntry4] : prn,

## 2022-03-01 NOTE — ASSESSMENT
[Indicate if, in your opinion, the incident that the patient described was the competent medical cause of this injury/illness.] : The incident that the patient described was the competent medical cause of this injury/illness: Yes [Indicate if the patient's complaints are consistent with his/her history of the injury/illness.] : Indicate if the patient's complaints are consistent with his/her history of the injury/illness: Yes [Yes] : Yes, it is consistent [Can the patient return to usual work activities as indicated? If yes, indicate date___] : The patient can return to usual work activities on [unfilled] [Are there any work limitations? (If so, explain and quantify, including the anticipated duration of the limitations)] : There are work limitations. [FreeTextEntry1] : patient his history of left thigh injury some 2 ys ago, initially improved after a course of physical therapy but has continued since with worsening left thigh/inguinal area pain especially upon physical effort and at work.  [FreeTextEntry5] : 51 [FreeTextEntry6] : physcial exam. [FreeTextEntry7] : avoid stairs, avoid puling-pushing-lifting and carrying, avoid squatting and kneeling.

## 2022-03-01 NOTE — PHYSICAL EXAM
[General Appearance - Alert] : alert [General Appearance - In No Acute Distress] : in no acute distress [General Appearance - Well Nourished] : well nourished [General Appearance - Well Developed] : well developed [FreeTextEntry1] : no masses noted to inguinal palpation. there was some tenderness to palpation over the left inguinal/upper thigh area. there was no pain on pressing over throchanger or iliac bones. on the right there was full rom for hip rotation and flexion. on the left, patient reported pain with flexion of the hip and with hip rotation, but pain was referred to inguinal area rather than to the hip joint. slr resulted in left thigh pain not radiated. there was no abnormality to knee rom, reflexes were 2+ symmetrical in lower extremities and there were no sensory changes upon examination of the lower extremities.

## 2022-03-01 NOTE — HISTORY OF PRESENT ILLNESS
[Yes] : The patient is currently working. [FreeTextEntry1] : patient last seen in september 2020 by another physician who has now left the practice.\par patient reports that he continues with pain especially in his left groin area. pain varies in severity, sometimes being very severe. patient describes that working, negotiating stairs, lifting and carrying heavy loads result in worsening of his pain. \par he has continued working since the accident. he reports that initial course of PT did help for pain but it recurred very closely after stopping the therapy and the pain has continued since. he states he has not seen any medical professional for his pain during this past year. pain varies in severity. this past weekend he went to work carrying tile up and down stairs and this resulted in very severe pain that afternoon/night. he takes aleve as needed for pain. he reports no numbness or tingling in his leg, he reports no loss of strength and has not tripped. \par patient continues working as a sellers.  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 patient tripped on a cable while at work, injurint his left lower extremity.  [FreeTextEntry3] : negotiating stairs, lifting-pulling-pushing and carrying, bending and kneeling, twisting.  [FreeTextEntry4] : initial course of physical therapy helped.  [FreeTextEntry5] : none [Has the patient missed work because of the injury/illness?] : The patient has not missed work because of the injury/illness.

## 2022-03-15 ENCOUNTER — APPOINTMENT (OUTPATIENT)
Dept: PHYSICAL MEDICINE AND REHAB | Facility: CLINIC | Age: 52
End: 2022-03-15

## 2022-04-05 ENCOUNTER — APPOINTMENT (OUTPATIENT)
Dept: PHYSICAL MEDICINE AND REHAB | Facility: CLINIC | Age: 52
End: 2022-04-05
Payer: OTHER MISCELLANEOUS

## 2022-04-05 VITALS
BODY MASS INDEX: 39.4 KG/M2 | HEIGHT: 68 IN | DIASTOLIC BLOOD PRESSURE: 88 MMHG | RESPIRATION RATE: 14 BRPM | WEIGHT: 260 LBS | SYSTOLIC BLOOD PRESSURE: 136 MMHG | HEART RATE: 77 BPM

## 2022-04-05 DIAGNOSIS — Z86.79 PERSONAL HISTORY OF OTHER DISEASES OF THE CIRCULATORY SYSTEM: ICD-10-CM

## 2022-04-05 DIAGNOSIS — Z78.9 OTHER SPECIFIED HEALTH STATUS: ICD-10-CM

## 2022-04-05 PROCEDURE — 99204 OFFICE O/P NEW MOD 45 MIN: CPT

## 2022-04-05 PROCEDURE — 99072 ADDL SUPL MATRL&STAF TM PHE: CPT

## 2022-04-05 NOTE — ASSESSMENT
[FreeTextEntry1] : 51 y.o. M w/ h/o DM, HTN, ARACELI w/ c/o chronic left sided hip/groin pain after work related injury (1/3/2020).  I spent most of today's office visit (40 min) discussing possible etiologies, pathogenesis, further diagnostic work-up and non-operative management.  Pt. needs screening x-rays left hip to evaluate joint space and r/o any CAM or pincer type lesion.  Also needs contrast enhanced MRI left hip to r/o labral tear given chronicity and mechanical sxs related to joint.  Advised against chronic use of PO NSAIDs 2' patient's medical hx.  Will determine further non-operative management (ie, US guided injections) vs. Orthopedic operative intervention after imaging review.  Pt. is in agreement with plan.  All questions answered.  RTC few weeks.   [Indicate if, in your opinion, the incident that the patient described was the competent medical cause of this injury/illness.] : The incident that the patient described was the competent medical cause of this injury/illness: Yes [Indicate if the patient's complaints are consistent with his/her history of the injury/illness.] : Indicate if the patient's complaints are consistent with his/her history of the injury/illness: Yes [Yes] : Yes, it is consistent [Can the patient return to usual work activities as indicated? If yes, indicate date___] : The patient can return to usual work activities on [unfilled] [Are there any work limitations? (If so, explain and quantify, including the anticipated duration of the limitations)] : There are no work limitations.  [FreeTextEntry5] : 51 [Physical Disability Temporary Partial] : temporarily partial disabled

## 2022-04-05 NOTE — PHYSICAL EXAM
[FreeTextEntry1] : NAD\par A&Ox3\par Obese\par ROM L-spine: 3/4 full forward flexion w/ knees bent; 10-15' extension w/ pain in left groin\par ROM Hips: right hip smooth IR/ER w/o pain; left hip 20' ER (w/ pain); 30' IR (w/ pain)\par Pelvic tilt: ++ left\par Seated slump test: neg left\par SLR: neg left\par JOSÉ MIGUEL's: ++ left\par DTR's: 2+ knees; depressed ankles\par MMT: 4+/5 left HF; 5/5 B/L DF/PF\par Sensation: SILT\par Toe & Heel Walk: Yes\par Palpation: TTP over anterior left hip capsule\par Pain w/ passive IP stretch\par

## 2022-04-05 NOTE — HISTORY OF PRESENT ILLNESS
[FreeTextEntry1] : 51 y.o. M w/ h/o DM, HTN, ARACELI presents to office w/ c/o left sided groin pain after work related injury (1/3/2020).  Pt. states that he was "pulling a skid backwards" and slipped on a wire and felt a "tearing" sensation in left groin, as well as injuring his lower back.  Pt. states that he "slipped a disk" in his lumbar spine but his LBP is now better controlled.  Pt. denies pain radiating into his left thigh.  No N/T/B sensations.  Admits to "clunking" and "clicking".  Pt. never had imaging done of his left hip or pelvis.  Pt. had P.T. for his groin pain which was helpful for period of time. Pt. was seeing [facility] ED[facility]ARDS who has since left practice.  Pt. is still working full time for same company in construction.  No NSAIDs.  No injections.

## 2022-04-20 ENCOUNTER — TRANSCRIPTION ENCOUNTER (OUTPATIENT)
Age: 52
End: 2022-04-20

## 2022-04-21 ENCOUNTER — NON-APPOINTMENT (OUTPATIENT)
Age: 52
End: 2022-04-21

## 2022-04-22 ENCOUNTER — RX RENEWAL (OUTPATIENT)
Age: 52
End: 2022-04-22

## 2022-05-09 ENCOUNTER — FORM ENCOUNTER (OUTPATIENT)
Age: 52
End: 2022-05-09

## 2022-05-12 ENCOUNTER — FORM ENCOUNTER (OUTPATIENT)
Age: 52
End: 2022-05-12

## 2022-05-19 ENCOUNTER — FORM ENCOUNTER (OUTPATIENT)
Age: 52
End: 2022-05-19

## 2022-05-26 ENCOUNTER — APPOINTMENT (OUTPATIENT)
Dept: INTERNAL MEDICINE | Facility: CLINIC | Age: 52
End: 2022-05-26

## 2022-08-22 ENCOUNTER — APPOINTMENT (OUTPATIENT)
Dept: PULMONOLOGY | Facility: CLINIC | Age: 52
End: 2022-08-22

## 2022-08-22 VITALS
BODY MASS INDEX: 40.75 KG/M2 | HEART RATE: 82 BPM | WEIGHT: 268 LBS | RESPIRATION RATE: 16 BRPM | OXYGEN SATURATION: 98 % | DIASTOLIC BLOOD PRESSURE: 86 MMHG | SYSTOLIC BLOOD PRESSURE: 148 MMHG

## 2022-08-22 PROCEDURE — 99214 OFFICE O/P EST MOD 30 MIN: CPT

## 2022-08-22 NOTE — ASSESSMENT
[FreeTextEntry1] : Severe stretch of sleep apnea with excellent compliance and benefit from AutoPAP. Supplies were renewed. Followup will be in one year.

## 2022-08-22 NOTE — HISTORY OF PRESENT ILLNESS
[Obstructive Sleep Apnea] : obstructive sleep apnea [Home] : home [APAP:] : APAP [TextBox_100] : 8/21 [TextBox_108] : 71 [TextBox_112] : 83 [TextBox_116] : 74 [TextBox_125] : 6-18 [TextBox_127] : 7/22 [TextBox_129] : 8/22 [TextBox_133] : 93 [TextBox_137] : 90 [TextBox_141] : 5 [TextBox_143] : 54 [TextBox_147] : 1.1 [TextBox_165] : the patient is much more alert since going on AutoPAP.\par

## 2022-10-13 PROBLEM — Z13.31 SCREENING FOR DEPRESSION: Status: ACTIVE | Noted: 2021-11-15

## 2022-11-03 ENCOUNTER — APPOINTMENT (OUTPATIENT)
Dept: RADIOLOGY | Facility: CLINIC | Age: 52
End: 2022-11-03

## 2022-11-03 ENCOUNTER — APPOINTMENT (OUTPATIENT)
Dept: POPULATION HEALTH | Facility: CLINIC | Age: 52
End: 2022-11-03

## 2022-11-03 ENCOUNTER — OUTPATIENT (OUTPATIENT)
Dept: OUTPATIENT SERVICES | Facility: HOSPITAL | Age: 52
LOS: 1 days | End: 2022-11-03
Payer: COMMERCIAL

## 2022-11-03 ENCOUNTER — RX RENEWAL (OUTPATIENT)
Age: 52
End: 2022-11-03

## 2022-11-03 VITALS
OXYGEN SATURATION: 99 % | HEART RATE: 62 BPM | BODY MASS INDEX: 40.01 KG/M2 | TEMPERATURE: 98.3 F | HEIGHT: 68 IN | DIASTOLIC BLOOD PRESSURE: 80 MMHG | WEIGHT: 264 LBS | SYSTOLIC BLOOD PRESSURE: 124 MMHG

## 2022-11-03 DIAGNOSIS — M54.50 LOW BACK PAIN, UNSPECIFIED: ICD-10-CM

## 2022-11-03 PROCEDURE — 72100 X-RAY EXAM L-S SPINE 2/3 VWS: CPT

## 2022-11-03 PROCEDURE — 99214 OFFICE O/P EST MOD 30 MIN: CPT

## 2022-11-03 PROCEDURE — 99072 ADDL SUPL MATRL&STAF TM PHE: CPT

## 2022-11-03 PROCEDURE — 72100 X-RAY EXAM L-S SPINE 2/3 VWS: CPT | Mod: 26

## 2022-11-03 NOTE — PLAN
[FreeTextEntry1] : will have him out of work for a couple of days, course of NSAID and muscle relaxant. stay at rest for a day or so but then gently start stretching exercises and gentle walking. will get x rays of lumbar spine to r/o other conditons. will see him in a week to decide further treatment. patient states there is no modified duty at work and he cannot take off work.  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  [FreeTextEntry4] : 1 week

## 2022-11-03 NOTE — HISTORY OF PRESENT ILLNESS
[FreeTextEntry1] : patient reports severe low back pain that started yesterday after performing his work duties. pain is severe, located in the lower back, radiated in band but not to the lower extremities. he reports no difficulty urinating or with defection, no loss of strength and no radiation to the lower extremities.\par reports severe stiffness of his lower back and much difficulty with movement, changing positions and similar. \par patient si working in the construction industry. \par he was last seen here in march 2022 and referred to ortho. ortho requested some hip studies which were denied by his w[employer] insurance. pt did not proceed any medical recommendations since.\par pt states his initial injury at work some 2 ys ago did result in a back injury in addition to groin pain. \par pt is working. \par : no news.  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 patient tripped on a cable while at work, injurint his left lower extremity.  [FreeTextEntry3] : negotiating stairs, lifting-pulling-pushing and carrying, bending and kneeling, twisting.  [FreeTextEntry4] : initial course of physical therapy helped.  [FreeTextEntry5] : none [Has the patient missed work because of the injury/illness?] : The patient has not missed work because of the injury/illness. [Yes] : The patient is currently working.

## 2022-11-03 NOTE — ASSESSMENT
[Indicate if, in your opinion, the incident that the patient described was the competent medical cause of this injury/illness.] : The incident that the patient described was the competent medical cause of this injury/illness: Yes [Indicate if the patient's complaints are consistent with his/her history of the injury/illness.] : Indicate if the patient's complaints are consistent with his/her history of the injury/illness: Yes [Yes] : Yes, it is consistent [Can the patient return to usual work activities as indicated? If yes, indicate date___] : The patient can return to usual work activities on [unfilled] [Are there any work limitations? (If so, explain and quantify, including the anticipated duration of the limitations)] : There are work limitations. [FreeTextEntry1] : acute relapse of low back pain most probably due to muscle strain at work.  [FreeTextEntry5] : 51 [FreeTextEntry6] : physcial exam. [FreeTextEntry7] : avoid stairs, avoid pulling-pushing-lifting and carrying, avoid squatting and kneeling.

## 2022-11-03 NOTE — PHYSICAL EXAM
[General Appearance - Alert] : alert [General Appearance - In No Acute Distress] : in no acute distress [General Appearance - Well Nourished] : well nourished [General Appearance - Well Developed] : well developed [FreeTextEntry1] : there was no tenderness when pressing over trochanter or iliac bones. there was full rom for hip rotation and flexion bilateral.

## 2022-11-10 ENCOUNTER — APPOINTMENT (OUTPATIENT)
Dept: POPULATION HEALTH | Facility: CLINIC | Age: 52
End: 2022-11-10

## 2022-11-10 VITALS
TEMPERATURE: 98.2 F | HEIGHT: 68 IN | DIASTOLIC BLOOD PRESSURE: 80 MMHG | WEIGHT: 264 LBS | BODY MASS INDEX: 40.01 KG/M2 | HEART RATE: 82 BPM | OXYGEN SATURATION: 95 % | SYSTOLIC BLOOD PRESSURE: 128 MMHG

## 2022-11-10 DIAGNOSIS — M54.50 LOW BACK PAIN, UNSPECIFIED: ICD-10-CM

## 2022-11-10 DIAGNOSIS — M25.552 PAIN IN RIGHT HIP: ICD-10-CM

## 2022-11-10 DIAGNOSIS — S76.919A STRAIN OF UNSPECIFIED MUSCLES, FASCIA AND TENDONS AT THIGH LEVEL, UNSPECIFIED THIGH, INITIAL ENCOUNTER: ICD-10-CM

## 2022-11-10 DIAGNOSIS — M25.551 PAIN IN RIGHT HIP: ICD-10-CM

## 2022-11-10 PROCEDURE — 99072 ADDL SUPL MATRL&STAF TM PHE: CPT

## 2022-11-10 PROCEDURE — 99213 OFFICE O/P EST LOW 20 MIN: CPT

## 2022-11-10 NOTE — HISTORY OF PRESENT ILLNESS
[FreeTextEntry1] : patient's symptoms have improved with medication and rest. he is able to move more freely, reporting back discomfort at the end of the day. completed course of NSAID and muscle relaxant, which helped. has been doing some stretching at home. \par reports no difficulty urinating or with defection, no loss of strength and no radiation to the lower extremities.\par patient is working in the construction industry. \par wc: no news.  [FreeTextEntry2] : verito [FreeTextEntry3] : negotiating stairs, lifting-pulling-pushing and carrying, bending and kneeling, twisting.  [FreeTextEntry4] : initial course of physical therapy helped.  [FreeTextEntry5] : none [FreeTextEntry6] : nov 3, 2022

## 2022-11-10 NOTE — PHYSICAL EXAM
[FreeTextEntry1] : there was no tenderness when pressing over trochanter or iliac bones. there was full rom for hip rotation and flexion bilateral. patient reported some pulling in his back when rotating the right hip.

## 2022-11-10 NOTE — ASSESSMENT
[FreeTextEntry1] : spine x ray 11-3-22: no compression fractures, osteophytes, broad levocurvature, slight grade 1 anterolisthesis l4 on l5 without spondylolysis.\par a. overall better from acute relapse of low back pain. patient has underlying conditions that make him more prone to develop injuries to his lower back.   [FreeTextEntry5] : 51 [FreeTextEntry6] : Physcial exam, x rays [FreeTextEntry7] : avoid stairs, avoid pulling-pushing-lifting and carrying, avoid squatting and kneeling.

## 2022-11-10 NOTE — PLAN
[FreeTextEntry1] : pt to return to work, recommended to limit exacerbating factors and exposures as much as possible and to rotate chores and positioning at work. will do a course of PT 2 x wk x 8 weeks, mostly to teach patient strategies to do at home on an ongoing basis. this serves as medical necessity for this treatment. will leave NSAID and muscle relaxant prn. will see him prn. given some handouts of exercises to do at home.  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  [FreeTextEntry4] : prn.

## 2022-11-29 ENCOUNTER — RX RENEWAL (OUTPATIENT)
Age: 52
End: 2022-11-29

## 2023-02-09 ENCOUNTER — APPOINTMENT (OUTPATIENT)
Dept: INTERNAL MEDICINE | Facility: CLINIC | Age: 53
End: 2023-02-09
Payer: COMMERCIAL

## 2023-02-09 VITALS
OXYGEN SATURATION: 97 % | WEIGHT: 270 LBS | DIASTOLIC BLOOD PRESSURE: 85 MMHG | HEART RATE: 87 BPM | HEIGHT: 68 IN | RESPIRATION RATE: 14 BRPM | SYSTOLIC BLOOD PRESSURE: 125 MMHG | BODY MASS INDEX: 40.92 KG/M2

## 2023-02-09 PROCEDURE — 36415 COLL VENOUS BLD VENIPUNCTURE: CPT

## 2023-02-09 PROCEDURE — 99214 OFFICE O/P EST MOD 30 MIN: CPT | Mod: 25

## 2023-02-09 RX ORDER — CYCLOBENZAPRINE HYDROCHLORIDE 10 MG/1
10 TABLET, FILM COATED ORAL
Qty: 10 | Refills: 6 | Status: DISCONTINUED | COMMUNITY
Start: 2022-11-03 | End: 2023-02-09

## 2023-02-09 RX ORDER — NABUMETONE 750 MG/1
750 TABLET, FILM COATED ORAL
Qty: 20 | Refills: 6 | Status: DISCONTINUED | COMMUNITY
Start: 2022-11-03 | End: 2023-02-09

## 2023-02-11 LAB
ALBUMIN SERPL ELPH-MCNC: 4.5 G/DL
ALP BLD-CCNC: 126 U/L
ALT SERPL-CCNC: 24 U/L
ANION GAP SERPL CALC-SCNC: 16 MMOL/L
AST SERPL-CCNC: 18 U/L
BASOPHILS # BLD AUTO: 0.05 K/UL
BASOPHILS NFR BLD AUTO: 0.5 %
BILIRUB SERPL-MCNC: 0.4 MG/DL
BUN SERPL-MCNC: 21 MG/DL
CALCIUM SERPL-MCNC: 9.1 MG/DL
CHLORIDE SERPL-SCNC: 101 MMOL/L
CHOLEST SERPL-MCNC: 167 MG/DL
CO2 SERPL-SCNC: 21 MMOL/L
CREAT SERPL-MCNC: 1.08 MG/DL
EGFR: 83 ML/MIN/1.73M2
EOSINOPHIL # BLD AUTO: 0.23 K/UL
EOSINOPHIL NFR BLD AUTO: 2.5 %
ESTIMATED AVERAGE GLUCOSE: 154 MG/DL
GLUCOSE SERPL-MCNC: 232 MG/DL
HBA1C MFR BLD HPLC: 7 %
HCT VFR BLD CALC: 43.5 %
HDLC SERPL-MCNC: 45 MG/DL
HGB BLD-MCNC: 14.2 G/DL
IMM GRANULOCYTES NFR BLD AUTO: 0.2 %
LDLC SERPL CALC-MCNC: 89 MG/DL
LYMPHOCYTES # BLD AUTO: 1.96 K/UL
LYMPHOCYTES NFR BLD AUTO: 21.5 %
MAN DIFF?: NORMAL
MCHC RBC-ENTMCNC: 27.7 PG
MCHC RBC-ENTMCNC: 32.6 GM/DL
MCV RBC AUTO: 84.8 FL
MONOCYTES # BLD AUTO: 0.68 K/UL
MONOCYTES NFR BLD AUTO: 7.4 %
NEUTROPHILS # BLD AUTO: 6.19 K/UL
NEUTROPHILS NFR BLD AUTO: 67.9 %
NONHDLC SERPL-MCNC: 122 MG/DL
PLATELET # BLD AUTO: 229 K/UL
POTASSIUM SERPL-SCNC: 4 MMOL/L
PROT SERPL-MCNC: 7.1 G/DL
RBC # BLD: 5.13 M/UL
RBC # FLD: 13.2 %
SODIUM SERPL-SCNC: 139 MMOL/L
TRIGL SERPL-MCNC: 165 MG/DL
TSH SERPL-ACNC: 1.98 UIU/ML
WBC # FLD AUTO: 9.13 K/UL

## 2023-02-11 NOTE — ASSESSMENT
[FreeTextEntry1] : Venipuncture done in our office, Labs sent out. Patient advised to continue present medications with diet/exercise and specialists followup. Patient  will return to the office in 3-4 months for CP\par \par \par \par Colonoscopy=had 1/2022\par Declines flu shot, +got covid vaccine X2, declines booster\par Shingrix d/w pt=declines\par MA =sent out\par MD's\par 1.Cardio-Dr. Carreno\par 2.EYE MD?MD name-in [place] "to do"\par 3.Pulmonary-Dr. Gonzalez\par 4.WCC MD\par 5.Pain MGT-Dr. Antonio

## 2023-02-11 NOTE — HISTORY OF PRESENT ILLNESS
[FreeTextEntry1] : Followup [de-identified] : Pt presents for followup\par -HTN on Benicar\par -hyperlipidemia, declines statin\par -T2DM, managing dietarily\par -+severe ARACELI, on CPAP\par -got covid vaccines X2\par -no new issues, NOT FASTING

## 2023-02-13 ENCOUNTER — NON-APPOINTMENT (OUTPATIENT)
Age: 53
End: 2023-02-13

## 2023-02-14 ENCOUNTER — NON-APPOINTMENT (OUTPATIENT)
Age: 53
End: 2023-02-14

## 2023-02-14 LAB
CREAT SPEC-SCNC: 151 MG/DL
MICROALBUMIN 24H UR DL<=1MG/L-MCNC: <1.2 MG/DL
MICROALBUMIN/CREAT 24H UR-RTO: NORMAL MG/G

## 2023-03-23 ENCOUNTER — APPOINTMENT (OUTPATIENT)
Dept: INTERNAL MEDICINE | Facility: CLINIC | Age: 53
End: 2023-03-23
Payer: COMMERCIAL

## 2023-03-23 VITALS
RESPIRATION RATE: 12 BRPM | SYSTOLIC BLOOD PRESSURE: 126 MMHG | WEIGHT: 288 LBS | OXYGEN SATURATION: 99 % | BODY MASS INDEX: 43.65 KG/M2 | HEIGHT: 68 IN | DIASTOLIC BLOOD PRESSURE: 80 MMHG | HEART RATE: 88 BPM

## 2023-03-23 DIAGNOSIS — R74.8 ABNORMAL LEVELS OF OTHER SERUM ENZYMES: ICD-10-CM

## 2023-03-23 PROCEDURE — 36415 COLL VENOUS BLD VENIPUNCTURE: CPT

## 2023-03-23 PROCEDURE — 99213 OFFICE O/P EST LOW 20 MIN: CPT | Mod: 25

## 2023-03-24 ENCOUNTER — NON-APPOINTMENT (OUTPATIENT)
Age: 53
End: 2023-03-24

## 2023-03-24 LAB
ALBUMIN SERPL ELPH-MCNC: 4.7 G/DL
ALP BLD-CCNC: 110 U/L
ALT SERPL-CCNC: 29 U/L
AST SERPL-CCNC: 22 U/L
BILIRUB DIRECT SERPL-MCNC: 0.1 MG/DL
BILIRUB INDIRECT SERPL-MCNC: 0.3 MG/DL
BILIRUB SERPL-MCNC: 0.5 MG/DL
PROT SERPL-MCNC: 7.2 G/DL

## 2023-03-24 NOTE — HISTORY OF PRESENT ILLNESS
[de-identified] : Pt presents for followup\par -HTN on Benicar\par -Last blood work showed alk phos elevated at 126, abnormal results discussed with patient and questions answered\par -got covid vaccines X2\par  [FreeTextEntry1] : Followup

## 2023-03-24 NOTE — ASSESSMENT
[FreeTextEntry1] : Venipuncture done in our office, Labs sent out. Patient advised to continue present medications with diet/exercise and specialists followup. Patient  will return to the office as sched for reg check\par \par \par \par Colonoscopy=had 1/2022\par Declines flu shot, +got covid vaccine X2, declines booster\par Shingrix d/w pt=declines\par MA =2/2023\par MD's\par 1.Cardio-Dr. Carreno\par 2.EYE MD?MD name-in [place] "to do"\par 3.Pulmonary-Dr. Gonzalez\par 4.WCC MD\par 5.Pain MGT-Dr. Antonio

## 2023-05-24 ENCOUNTER — NON-APPOINTMENT (OUTPATIENT)
Age: 53
End: 2023-05-24

## 2023-05-24 ENCOUNTER — APPOINTMENT (OUTPATIENT)
Dept: INTERNAL MEDICINE | Facility: CLINIC | Age: 53
End: 2023-05-24
Payer: COMMERCIAL

## 2023-05-24 VITALS
RESPIRATION RATE: 16 BRPM | OXYGEN SATURATION: 98 % | HEIGHT: 68 IN | BODY MASS INDEX: 39.56 KG/M2 | SYSTOLIC BLOOD PRESSURE: 135 MMHG | WEIGHT: 261 LBS | DIASTOLIC BLOOD PRESSURE: 80 MMHG | HEART RATE: 71 BPM

## 2023-05-24 DIAGNOSIS — G47.33 OBSTRUCTIVE SLEEP APNEA (ADULT) (PEDIATRIC): ICD-10-CM

## 2023-05-24 DIAGNOSIS — Z00.00 ENCOUNTER FOR GENERAL ADULT MEDICAL EXAMINATION W/OUT ABNORMAL FINDINGS: ICD-10-CM

## 2023-05-24 DIAGNOSIS — Z12.5 ENCOUNTER FOR SCREENING FOR MALIGNANT NEOPLASM OF PROSTATE: ICD-10-CM

## 2023-05-24 DIAGNOSIS — E11.65 TYPE 2 DIABETES MELLITUS WITH HYPERGLYCEMIA: ICD-10-CM

## 2023-05-24 DIAGNOSIS — Z13.6 ENCOUNTER FOR SCREENING FOR CARDIOVASCULAR DISORDERS: ICD-10-CM

## 2023-05-24 PROCEDURE — 99396 PREV VISIT EST AGE 40-64: CPT | Mod: 25

## 2023-05-24 PROCEDURE — 36415 COLL VENOUS BLD VENIPUNCTURE: CPT

## 2023-05-24 PROCEDURE — 93000 ELECTROCARDIOGRAM COMPLETE: CPT

## 2023-05-24 NOTE — ASSESSMENT
[FreeTextEntry1] : 52-year-old male with a long history of a obesity having made good lifestyle changes mainly with diet with 20 pound weight loss. Patient encouraged to continue but improved and at exercise.\par \par Long history of hypertension controlled with olmesartan therefore will continue.\par \par New diagnosis of type 2 diabetes with patient having a good lifestyle changes would likely improvement. Hopefully can avoid medications.\par \par Mild hyperlipidemia but likely will start statin therapy with patient being a diabetic. We'll decide after this lipid profile is reviewed.\par \par Colonoscopy January 2022\par \par Declines influenza vaccine\par Received the COVID vaccine\par \par Venipuncture done today in the office\par Followup in 3 months

## 2023-05-24 NOTE — PHYSICAL EXAM
[No Acute Distress] : no acute distress [Well Nourished] : well nourished [Well Developed] : well developed [Well-Appearing] : well-appearing [Normal Sclera/Conjunctiva] : normal sclera/conjunctiva [PERRL] : pupils equal round and reactive to light [EOMI] : extraocular movements intact [Normal Outer Ear/Nose] : the outer ears and nose were normal in appearance [Normal Oropharynx] : the oropharynx was normal [No JVD] : no jugular venous distention [No Lymphadenopathy] : no lymphadenopathy [Supple] : supple [Thyroid Normal, No Nodules] : the thyroid was normal and there were no nodules present [No Respiratory Distress] : no respiratory distress  [No Accessory Muscle Use] : no accessory muscle use [Clear to Auscultation] : lungs were clear to auscultation bilaterally [Normal Rate] : normal rate  [Regular Rhythm] : with a regular rhythm [Normal S1, S2] : normal S1 and S2 [No Murmur] : no murmur heard [No Carotid Bruits] : no carotid bruits [No Abdominal Bruit] : a ~M bruit was not heard ~T in the abdomen [No Varicosities] : no varicosities [Pedal Pulses Present] : the pedal pulses are present [No Edema] : there was no peripheral edema [No Palpable Aorta] : no palpable aorta [No Extremity Clubbing/Cyanosis] : no extremity clubbing/cyanosis [Soft] : abdomen soft [Non Tender] : non-tender [Non-distended] : non-distended [No Masses] : no abdominal mass palpated [No HSM] : no HSM [Normal Bowel Sounds] : normal bowel sounds [Rectal Exam - Rectum] : digital rectal exam was normal [Prostate Enlargement] : the prostate was not enlarged [Prostate Tenderness] : the prostate was not tender [No Prostate Nodules] : no prostate nodules [No CVA Tenderness] : no CVA  tenderness [No Spinal Tenderness] : no spinal tenderness [No Joint Swelling] : no joint swelling [Grossly Normal Strength/Tone] : grossly normal strength/tone [No Rash] : no rash [Coordination Grossly Intact] : coordination grossly intact [No Focal Deficits] : no focal deficits [Normal Gait] : normal gait [Deep Tendon Reflexes (DTR)] : deep tendon reflexes were 2+ and symmetric [Normal Affect] : the affect was normal [Normal Insight/Judgement] : insight and judgment were intact [Right Foot Was Examined] : Right foot ~C was examined [Left Foot Was Examined] : left foot ~C was examined [None] : no ulcers in either foot were found [] : both feet [de-identified] : "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

## 2023-05-24 NOTE — HEALTH RISK ASSESSMENT
[Very Good] : ~his/her~  mood as very good [Yes] : Yes [2 - 4 times a month (2 pts)] : 2-4 times a month (2 points) [1 or 2 (0 pts)] : 1 or 2 (0 points) [Never (0 pts)] : Never (0 points) [No] : In the past 12 months have you used drugs other than those required for medical reasons? No [No falls in past year] : Patient reported no falls in the past year [0] : 2) Feeling down, depressed, or hopeless: Not at all (0) [PHQ-2 Negative - No further assessment needed] : PHQ-2 Negative - No further assessment needed [No Retinopathy] : No retinopathy [None] : None [With Significant Other] : lives with significant other [Employed] : employed [High School] : high school [] :  [# Of Children ___] : has [unfilled] children [Fully functional (bathing, dressing, toileting, transferring, walking, feeding)] : Fully functional (bathing, dressing, toileting, transferring, walking, feeding) [Fully functional (using the telephone, shopping, preparing meals, housekeeping, doing laundry, using] : Fully functional and needs no help or supervision to perform IADLs (using the telephone, shopping, preparing meals, housekeeping, doing laundry, using transportation, managing medications and managing finances) [Smoke Detector] : smoke detector [Carbon Monoxide Detector] : carbon monoxide detector [Seat Belt] :  uses seat belt [Sunscreen] : uses sunscreen [Reviewed no changes] : Reviewed, no changes [Designated Healthcare Proxy] : Designated healthcare proxy [Never] : Never [Good] : ~his/her~  mood as  good [Name: ___] : Health Care Proxy's Name: [unfilled]  [Audit-CScore] : 2 [de-identified] : active [de-identified] : improved [[id number]] : 0 [EyeExamDate] : 09/21 [Change in mental status noted] : No change in mental status noted [Reports changes in hearing] : Reports no changes in hearing [Reports changes in vision] : Reports no changes in vision [Reports changes in dental health] : Reports no changes in dental health [FreeTextEntry2] : Construction [AdvancecareDate] : 05/23

## 2023-05-24 NOTE — HISTORY OF PRESENT ILLNESS
[de-identified] : 52-year-old male presents for his overdue yearly physical. His last physical was in 2015.\par \par He does have a history of hypertension which he was trying to control with lifestyle changes.\par He followed of August 2021 with uncontrolled hypertension therefore treated with olmesartan 20 mg daily started which he has tolerated with good blood pressure control.\par \par Also blood work at that time showed hemoglobin A1c at 8 with followup 3 weeks later at 7.8 therefore patient is a type II diabetic.  Family history of diabetes in his father.\par He had made some good lifestyle changes with hemoglobin A1c down to 6 but did not stay with his changes and increased to 7.  He again is doing better changes with hopeful improvement.\par \par LDL was greater than 100 with recommending statin which he did not want with last LDL improved.\par \par Also a history of sleep apnea for which he recently was reevaluated by sleep specialist with positive ARACELI on CPAP with good tolerance and good benefit.\par \par Overall he feels well without complaints including no chest pain, palpitations, shortness of breath or edema.\par \par He is a nonsmoker and minimally drinks alcohol.\par He is  with no children.\par He does physical work in construction.

## 2023-05-25 ENCOUNTER — NON-APPOINTMENT (OUTPATIENT)
Age: 53
End: 2023-05-25

## 2023-05-25 LAB
ALBUMIN SERPL ELPH-MCNC: 4.7 G/DL
ALP BLD-CCNC: 112 U/L
ALT SERPL-CCNC: 29 U/L
ANION GAP SERPL CALC-SCNC: 14 MMOL/L
APPEARANCE: CLEAR
AST SERPL-CCNC: 22 U/L
BACTERIA: NEGATIVE /HPF
BILIRUB SERPL-MCNC: 0.5 MG/DL
BILIRUBIN URINE: NEGATIVE
BLOOD URINE: NEGATIVE
BUN SERPL-MCNC: 18 MG/DL
CALCIUM SERPL-MCNC: 9.8 MG/DL
CAST: 0 /LPF
CHLORIDE SERPL-SCNC: 103 MMOL/L
CHOLEST SERPL-MCNC: 185 MG/DL
CO2 SERPL-SCNC: 24 MMOL/L
COLOR: YELLOW
CREAT SERPL-MCNC: 1.12 MG/DL
CREAT SPEC-SCNC: 182 MG/DL
EGFR: 80 ML/MIN/1.73M2
EPITHELIAL CELLS: 0 /HPF
ESTIMATED AVERAGE GLUCOSE: 154 MG/DL
GLUCOSE QUALITATIVE U: NEGATIVE MG/DL
GLUCOSE SERPL-MCNC: 105 MG/DL
HBA1C MFR BLD HPLC: 7 %
HDLC SERPL-MCNC: 60 MG/DL
KETONES URINE: NEGATIVE MG/DL
LDLC SERPL CALC-MCNC: 107 MG/DL
LEUKOCYTE ESTERASE URINE: NEGATIVE
MICROALBUMIN 24H UR DL<=1MG/L-MCNC: 1.3 MG/DL
MICROALBUMIN/CREAT 24H UR-RTO: 7 MG/G
MICROSCOPIC-UA: NORMAL
NITRITE URINE: NEGATIVE
NONHDLC SERPL-MCNC: 125 MG/DL
PH URINE: 5.5
POTASSIUM SERPL-SCNC: 4.2 MMOL/L
PROT SERPL-MCNC: 7.6 G/DL
PROTEIN URINE: NEGATIVE MG/DL
PSA SERPL-MCNC: 0.67 NG/ML
RED BLOOD CELLS URINE: 0 /HPF
SODIUM SERPL-SCNC: 141 MMOL/L
SPECIFIC GRAVITY URINE: 1.02
TRIGL SERPL-MCNC: 94 MG/DL
UROBILINOGEN URINE: 0.2 MG/DL
WHITE BLOOD CELLS URINE: 0 /HPF

## 2023-08-14 ENCOUNTER — RX RENEWAL (OUTPATIENT)
Age: 53
End: 2023-08-14

## 2023-09-22 ENCOUNTER — APPOINTMENT (OUTPATIENT)
Dept: INTERNAL MEDICINE | Facility: CLINIC | Age: 53
End: 2023-09-22
Payer: COMMERCIAL

## 2023-09-22 VITALS
BODY MASS INDEX: 41.36 KG/M2 | SYSTOLIC BLOOD PRESSURE: 120 MMHG | WEIGHT: 272 LBS | RESPIRATION RATE: 12 BRPM | HEART RATE: 60 BPM | DIASTOLIC BLOOD PRESSURE: 80 MMHG

## 2023-09-22 DIAGNOSIS — Z23 ENCOUNTER FOR IMMUNIZATION: ICD-10-CM

## 2023-09-22 PROCEDURE — 90715 TDAP VACCINE 7 YRS/> IM: CPT

## 2023-09-22 PROCEDURE — 99214 OFFICE O/P EST MOD 30 MIN: CPT | Mod: 25

## 2023-09-22 PROCEDURE — 36415 COLL VENOUS BLD VENIPUNCTURE: CPT

## 2023-09-22 PROCEDURE — 90471 IMMUNIZATION ADMIN: CPT

## 2023-09-23 LAB
ALBUMIN SERPL ELPH-MCNC: 4.9 G/DL
ALP BLD-CCNC: 108 U/L
ALT SERPL-CCNC: 33 U/L
ANION GAP SERPL CALC-SCNC: 12 MMOL/L
AST SERPL-CCNC: 16 U/L
BASOPHILS # BLD AUTO: 0.05 K/UL
BASOPHILS NFR BLD AUTO: 0.5 %
BILIRUB SERPL-MCNC: 0.4 MG/DL
BUN SERPL-MCNC: 21 MG/DL
CALCIUM SERPL-MCNC: 10 MG/DL
CHLORIDE SERPL-SCNC: 101 MMOL/L
CHOLEST SERPL-MCNC: 191 MG/DL
CO2 SERPL-SCNC: 27 MMOL/L
CREAT SERPL-MCNC: 1.16 MG/DL
EGFR: 76 ML/MIN/1.73M2
EOSINOPHIL # BLD AUTO: 0.26 K/UL
EOSINOPHIL NFR BLD AUTO: 2.6 %
ESTIMATED AVERAGE GLUCOSE: 160 MG/DL
GLUCOSE SERPL-MCNC: 101 MG/DL
HBA1C MFR BLD HPLC: 7.2 %
HCT VFR BLD CALC: 45.7 %
HDLC SERPL-MCNC: 52 MG/DL
HGB BLD-MCNC: 14.8 G/DL
IMM GRANULOCYTES NFR BLD AUTO: 0.4 %
LDLC SERPL CALC-MCNC: 120 MG/DL
LYMPHOCYTES # BLD AUTO: 2.23 K/UL
LYMPHOCYTES NFR BLD AUTO: 22.3 %
MAN DIFF?: NORMAL
MCHC RBC-ENTMCNC: 28.6 PG
MCHC RBC-ENTMCNC: 32.4 GM/DL
MCV RBC AUTO: 88.2 FL
MONOCYTES # BLD AUTO: 0.76 K/UL
MONOCYTES NFR BLD AUTO: 7.6 %
NEUTROPHILS # BLD AUTO: 6.68 K/UL
NEUTROPHILS NFR BLD AUTO: 66.6 %
NONHDLC SERPL-MCNC: 139 MG/DL
PLATELET # BLD AUTO: 233 K/UL
POTASSIUM SERPL-SCNC: 4.7 MMOL/L
PROT SERPL-MCNC: 7.4 G/DL
RBC # BLD: 5.18 M/UL
RBC # FLD: 13.7 %
SODIUM SERPL-SCNC: 140 MMOL/L
TRIGL SERPL-MCNC: 106 MG/DL
TSH SERPL-ACNC: 2.16 UIU/ML
WBC # FLD AUTO: 10.02 K/UL

## 2024-01-23 ENCOUNTER — APPOINTMENT (OUTPATIENT)
Dept: INTERNAL MEDICINE | Facility: CLINIC | Age: 54
End: 2024-01-23
Payer: COMMERCIAL

## 2024-01-23 VITALS
RESPIRATION RATE: 13 BRPM | SYSTOLIC BLOOD PRESSURE: 120 MMHG | HEIGHT: 68 IN | HEART RATE: 88 BPM | BODY MASS INDEX: 40.92 KG/M2 | DIASTOLIC BLOOD PRESSURE: 80 MMHG | OXYGEN SATURATION: 98 % | WEIGHT: 270 LBS

## 2024-01-23 DIAGNOSIS — E78.5 HYPERLIPIDEMIA, UNSPECIFIED: ICD-10-CM

## 2024-01-23 DIAGNOSIS — E66.9 OBESITY, UNSPECIFIED: ICD-10-CM

## 2024-01-23 DIAGNOSIS — Z79.899 OTHER LONG TERM (CURRENT) DRUG THERAPY: ICD-10-CM

## 2024-01-23 DIAGNOSIS — E11.9 TYPE 2 DIABETES MELLITUS W/OUT COMPLICATIONS: ICD-10-CM

## 2024-01-23 PROCEDURE — 36415 COLL VENOUS BLD VENIPUNCTURE: CPT

## 2024-01-23 PROCEDURE — 99214 OFFICE O/P EST MOD 30 MIN: CPT

## 2024-01-24 LAB
ALBUMIN SERPL ELPH-MCNC: 4.6 G/DL
ALP BLD-CCNC: 105 U/L
ALT SERPL-CCNC: 30 U/L
ANION GAP SERPL CALC-SCNC: 13 MMOL/L
AST SERPL-CCNC: 22 U/L
BASOPHILS # BLD AUTO: 0.05 K/UL
BASOPHILS NFR BLD AUTO: 0.4 %
BILIRUB SERPL-MCNC: 0.4 MG/DL
BUN SERPL-MCNC: 24 MG/DL
CALCIUM SERPL-MCNC: 9.4 MG/DL
CHLORIDE SERPL-SCNC: 102 MMOL/L
CHOLEST SERPL-MCNC: 185 MG/DL
CO2 SERPL-SCNC: 24 MMOL/L
CREAT SERPL-MCNC: 1.14 MG/DL
EGFR: 77 ML/MIN/1.73M2
EOSINOPHIL # BLD AUTO: 0.11 K/UL
EOSINOPHIL NFR BLD AUTO: 1 %
ESTIMATED AVERAGE GLUCOSE: 163 MG/DL
GLUCOSE SERPL-MCNC: 103 MG/DL
HBA1C MFR BLD HPLC: 7.3 %
HCT VFR BLD CALC: 44.5 %
HDLC SERPL-MCNC: 51 MG/DL
HGB BLD-MCNC: 14.9 G/DL
IMM GRANULOCYTES NFR BLD AUTO: 0.3 %
LDLC SERPL CALC-MCNC: 116 MG/DL
LYMPHOCYTES # BLD AUTO: 2.04 K/UL
LYMPHOCYTES NFR BLD AUTO: 18.1 %
MAN DIFF?: NORMAL
MCHC RBC-ENTMCNC: 28.4 PG
MCHC RBC-ENTMCNC: 33.5 GM/DL
MCV RBC AUTO: 84.9 FL
MONOCYTES # BLD AUTO: 0.71 K/UL
MONOCYTES NFR BLD AUTO: 6.3 %
NEUTROPHILS # BLD AUTO: 8.32 K/UL
NEUTROPHILS NFR BLD AUTO: 73.9 %
NONHDLC SERPL-MCNC: 134 MG/DL
PLATELET # BLD AUTO: 229 K/UL
POTASSIUM SERPL-SCNC: 4.6 MMOL/L
PROT SERPL-MCNC: 7.4 G/DL
RBC # BLD: 5.24 M/UL
RBC # FLD: 13.2 %
SODIUM SERPL-SCNC: 139 MMOL/L
TRIGL SERPL-MCNC: 104 MG/DL
TSH SERPL-ACNC: 2.51 UIU/ML
WBC # FLD AUTO: 11.26 K/UL

## 2024-01-24 NOTE — HISTORY OF PRESENT ILLNESS
[de-identified] : Pt presents for followup -HTN on Benicar -hyperlipidemia, declines statin -T2DM, managing dietarily -+severe ARACELI, on CPAP -got covid vaccines X2 -no new issues

## 2024-01-24 NOTE — ADDENDUM
[FreeTextEntry1] : Labs show -WBC of 11.2, repeat 1 month -Diabetic control not optimal and LDL elevated, told to get serious with diet and exercise otherwise recommend medication for both

## 2024-01-24 NOTE — ASSESSMENT
[FreeTextEntry1] : Venipuncture done in our office, Labs sent out. Patient advised to continue present medications with diet/exercise and specialists followup. Patient will return to the office in may for CP    Colonoscopy=had 1/2022 Declines flu shot, +got covid vaccine X2, declines booster Shingrix d/w pt=declines MA =5/2023 MD's 1.Cardio-Dr. Carreno 2.EYE MD=8/2023 3.Pulmonary-Dr. Gonzalez 4.WCC MD 5.Pain MGT-Dr. Antonio PSA=5/2023.

## 2024-02-26 ENCOUNTER — APPOINTMENT (OUTPATIENT)
Dept: INTERNAL MEDICINE | Facility: CLINIC | Age: 54
End: 2024-02-26
Payer: COMMERCIAL

## 2024-02-26 VITALS
SYSTOLIC BLOOD PRESSURE: 120 MMHG | DIASTOLIC BLOOD PRESSURE: 82 MMHG | BODY MASS INDEX: 40.32 KG/M2 | WEIGHT: 266 LBS | HEART RATE: 81 BPM | RESPIRATION RATE: 13 BRPM | HEIGHT: 68 IN | OXYGEN SATURATION: 98 %

## 2024-02-26 DIAGNOSIS — I10 ESSENTIAL (PRIMARY) HYPERTENSION: ICD-10-CM

## 2024-02-26 DIAGNOSIS — D72.829 ELEVATED WHITE BLOOD CELL COUNT, UNSPECIFIED: ICD-10-CM

## 2024-02-26 PROCEDURE — 36415 COLL VENOUS BLD VENIPUNCTURE: CPT

## 2024-02-26 PROCEDURE — 99213 OFFICE O/P EST LOW 20 MIN: CPT

## 2024-02-26 PROCEDURE — G2211 COMPLEX E/M VISIT ADD ON: CPT | Mod: NC,1L

## 2024-02-26 NOTE — HISTORY OF PRESENT ILLNESS
[de-identified] : Pt presents for followup -HTN on Benicar -last labs showed WBC of 11.2, abnormal results d/w pt and questions answered -Patient has continued to struggle with weight, diabetes has been uncontrolled and he now agrees to Ozempic

## 2024-02-26 NOTE — ASSESSMENT
[FreeTextEntry1] : Ozempic sent to the pharmacy.  Venipuncture done in our office, Labs sent out. Patient advised to continue present medications with diet/exercise and specialists followup. Patient will return to the office for CP as sched    Colonoscopy=had 1/2022 Declines flu shot, +got covid vaccine X2, declines booster Shingrix d/w pt=declines MA =5/2023 MD's 1.Cardio-Dr. Carreno 2.EYE MD=8/2023 3.Pulmonary-Dr. Gonzalez 4.WCC MD 5.Pain MGT-Dr. Antonio PSA=5/2023.

## 2024-02-27 LAB
BASOPHILS # BLD AUTO: 0.04 K/UL
BASOPHILS NFR BLD AUTO: 0.6 %
EOSINOPHIL # BLD AUTO: 0.21 K/UL
EOSINOPHIL NFR BLD AUTO: 3 %
HCT VFR BLD CALC: 43.7 %
HGB BLD-MCNC: 14.6 G/DL
IMM GRANULOCYTES NFR BLD AUTO: 0.1 %
LYMPHOCYTES # BLD AUTO: 2.25 K/UL
LYMPHOCYTES NFR BLD AUTO: 31.8 %
MAN DIFF?: NORMAL
MCHC RBC-ENTMCNC: 27.6 PG
MCHC RBC-ENTMCNC: 33.4 GM/DL
MCV RBC AUTO: 82.6 FL
MONOCYTES # BLD AUTO: 0.56 K/UL
MONOCYTES NFR BLD AUTO: 7.9 %
NEUTROPHILS # BLD AUTO: 4 K/UL
NEUTROPHILS NFR BLD AUTO: 56.6 %
PLATELET # BLD AUTO: 219 K/UL
RBC # BLD: 5.29 M/UL
RBC # FLD: 13.1 %
WBC # FLD AUTO: 7.07 K/UL

## 2024-05-20 ENCOUNTER — RX RENEWAL (OUTPATIENT)
Age: 54
End: 2024-05-20

## 2024-05-20 RX ORDER — OLMESARTAN MEDOXOMIL 20 MG/1
20 TABLET, FILM COATED ORAL
Qty: 90 | Refills: 1 | Status: ACTIVE | COMMUNITY
Start: 2021-08-03 | End: 1900-01-01

## 2024-06-05 RX ORDER — SEMAGLUTIDE 0.68 MG/ML
2 INJECTION, SOLUTION SUBCUTANEOUS
Qty: 3 | Refills: 1 | Status: ACTIVE | COMMUNITY
Start: 2024-02-26 | End: 1900-01-01

## 2024-07-10 ENCOUNTER — NON-APPOINTMENT (OUTPATIENT)
Age: 54
End: 2024-07-10

## 2024-07-10 ENCOUNTER — APPOINTMENT (OUTPATIENT)
Dept: INTERNAL MEDICINE | Facility: CLINIC | Age: 54
End: 2024-07-10
Payer: COMMERCIAL

## 2024-07-10 VITALS
HEIGHT: 68 IN | BODY MASS INDEX: 36.83 KG/M2 | WEIGHT: 243 LBS | DIASTOLIC BLOOD PRESSURE: 72 MMHG | SYSTOLIC BLOOD PRESSURE: 118 MMHG | RESPIRATION RATE: 15 BRPM | HEART RATE: 65 BPM | OXYGEN SATURATION: 97 %

## 2024-07-10 DIAGNOSIS — Z12.5 ENCOUNTER FOR SCREENING FOR MALIGNANT NEOPLASM OF PROSTATE: ICD-10-CM

## 2024-07-10 DIAGNOSIS — E11.65 TYPE 2 DIABETES MELLITUS WITH HYPERGLYCEMIA: ICD-10-CM

## 2024-07-10 DIAGNOSIS — Z91.89 OTHER SPECIFIED PERSONAL RISK FACTORS, NOT ELSEWHERE CLASSIFIED: ICD-10-CM

## 2024-07-10 DIAGNOSIS — E78.5 HYPERLIPIDEMIA, UNSPECIFIED: ICD-10-CM

## 2024-07-10 DIAGNOSIS — Z79.899 OTHER LONG TERM (CURRENT) DRUG THERAPY: ICD-10-CM

## 2024-07-10 DIAGNOSIS — I10 ESSENTIAL (PRIMARY) HYPERTENSION: ICD-10-CM

## 2024-07-10 DIAGNOSIS — E66.9 OBESITY, UNSPECIFIED: ICD-10-CM

## 2024-07-10 DIAGNOSIS — Z00.00 ENCOUNTER FOR GENERAL ADULT MEDICAL EXAMINATION W/OUT ABNORMAL FINDINGS: ICD-10-CM

## 2024-07-10 DIAGNOSIS — Z13.6 ENCOUNTER FOR SCREENING FOR CARDIOVASCULAR DISORDERS: ICD-10-CM

## 2024-07-10 PROCEDURE — 93000 ELECTROCARDIOGRAM COMPLETE: CPT

## 2024-07-10 PROCEDURE — 99396 PREV VISIT EST AGE 40-64: CPT

## 2024-07-10 PROCEDURE — 36415 COLL VENOUS BLD VENIPUNCTURE: CPT

## 2024-07-10 RX ORDER — SEMAGLUTIDE 1.34 MG/ML
4 INJECTION, SOLUTION SUBCUTANEOUS
Qty: 3 | Refills: 1 | Status: ACTIVE | COMMUNITY
Start: 2024-07-10 | End: 1900-01-01

## 2024-07-11 LAB
ALBUMIN SERPL ELPH-MCNC: 4.7 G/DL
ALP BLD-CCNC: 106 U/L
ANION GAP SERPL CALC-SCNC: 16 MMOL/L
AST SERPL-CCNC: 24 U/L
BACTERIA: NEGATIVE /HPF
BASOPHILS # BLD AUTO: 0.05 K/UL
BASOPHILS NFR BLD AUTO: 0.6 %
BILIRUB SERPL-MCNC: 0.5 MG/DL
BILIRUBIN URINE: NEGATIVE
BLOOD URINE: NEGATIVE
BUN SERPL-MCNC: 21 MG/DL
CALCIUM SERPL-MCNC: 9.8 MG/DL
CHLORIDE SERPL-SCNC: 103 MMOL/L
CHOLEST SERPL-MCNC: 186 MG/DL
CO2 SERPL-SCNC: 22 MMOL/L
COLOR: YELLOW
CREAT SPEC-SCNC: 227 MG/DL
EGFR: 80 ML/MIN/1.73M2
EOSINOPHIL # BLD AUTO: 0.15 K/UL
EOSINOPHIL NFR BLD AUTO: 1.7 %
EPITHELIAL CELLS: 0 /HPF
ESTIMATED AVERAGE GLUCOSE: 123 MG/DL
GLUCOSE QUALITATIVE U: NEGATIVE MG/DL
GLUCOSE SERPL-MCNC: 88 MG/DL
HBA1C MFR BLD HPLC: 5.9 %
HCT VFR BLD CALC: 48.5 %
HDLC SERPL-MCNC: 57 MG/DL
IMM GRANULOCYTES NFR BLD AUTO: 0.2 %
KETONES URINE: ABNORMAL MG/DL
LDLC SERPL CALC-MCNC: 103 MG/DL
LEUKOCYTE ESTERASE URINE: NEGATIVE
LYMPHOCYTES # BLD AUTO: 2.15 K/UL
LYMPHOCYTES NFR BLD AUTO: 24.3 %
MAN DIFF?: NORMAL
MCHC RBC-ENTMCNC: 27.9 PG
MCHC RBC-ENTMCNC: 32.4 GM/DL
MCV RBC AUTO: 86.3 FL
MICROALBUMIN 24H UR DL<=1MG/L-MCNC: <1.2 MG/DL
MICROSCOPIC-UA: NORMAL
MONOCYTES # BLD AUTO: 0.62 K/UL
MONOCYTES NFR BLD AUTO: 7 %
NEUTROPHILS # BLD AUTO: 5.85 K/UL
NEUTROPHILS NFR BLD AUTO: 66.2 %
NONHDLC SERPL-MCNC: 128 MG/DL
PH URINE: 5.5
PLATELET # BLD AUTO: 229 K/UL
POTASSIUM SERPL-SCNC: 4.6 MMOL/L
PROT SERPL-MCNC: 7.7 G/DL
RBC # BLD: 5.62 M/UL
RBC # FLD: 13.8 %
RED BLOOD CELLS URINE: 0 /HPF
SODIUM SERPL-SCNC: 140 MMOL/L
SPECIFIC GRAVITY URINE: >1.03
TRIGL SERPL-MCNC: 143 MG/DL
UROBILINOGEN URINE: 1 MG/DL
WBC # FLD AUTO: 8.84 K/UL
WHITE BLOOD CELLS URINE: 0 /HPF

## 2024-10-15 ENCOUNTER — APPOINTMENT (OUTPATIENT)
Dept: INTERNAL MEDICINE | Facility: CLINIC | Age: 54
End: 2024-10-15
Payer: COMMERCIAL

## 2024-10-15 VITALS — HEIGHT: 68 IN | OXYGEN SATURATION: 97 % | HEART RATE: 95 BPM | BODY MASS INDEX: 36.07 KG/M2 | WEIGHT: 238 LBS

## 2024-10-15 VITALS
DIASTOLIC BLOOD PRESSURE: 76 MMHG | SYSTOLIC BLOOD PRESSURE: 120 MMHG | WEIGHT: 238 LBS | RESPIRATION RATE: 14 BRPM | BODY MASS INDEX: 36.19 KG/M2 | HEART RATE: 74 BPM

## 2024-10-15 DIAGNOSIS — I10 ESSENTIAL (PRIMARY) HYPERTENSION: ICD-10-CM

## 2024-10-15 DIAGNOSIS — Z79.899 OTHER LONG TERM (CURRENT) DRUG THERAPY: ICD-10-CM

## 2024-10-15 DIAGNOSIS — E11.9 TYPE 2 DIABETES MELLITUS W/OUT COMPLICATIONS: ICD-10-CM

## 2024-10-15 DIAGNOSIS — E78.5 HYPERLIPIDEMIA, UNSPECIFIED: ICD-10-CM

## 2024-10-15 PROCEDURE — 99214 OFFICE O/P EST MOD 30 MIN: CPT

## 2024-10-15 PROCEDURE — G2211 COMPLEX E/M VISIT ADD ON: CPT | Mod: NC

## 2024-10-15 PROCEDURE — 36415 COLL VENOUS BLD VENIPUNCTURE: CPT

## 2024-10-16 LAB
ALBUMIN SERPL ELPH-MCNC: 4.6 G/DL
ALP BLD-CCNC: 107 U/L
ALT SERPL-CCNC: 29 U/L
ANION GAP SERPL CALC-SCNC: 14 MMOL/L
AST SERPL-CCNC: 20 U/L
BASOPHILS # BLD AUTO: 0.05 K/UL
BASOPHILS NFR BLD AUTO: 0.5 %
BILIRUB SERPL-MCNC: 0.5 MG/DL
BUN SERPL-MCNC: 19 MG/DL
CALCIUM SERPL-MCNC: 9.4 MG/DL
CHLORIDE SERPL-SCNC: 101 MMOL/L
CHOLEST SERPL-MCNC: 179 MG/DL
CO2 SERPL-SCNC: 23 MMOL/L
CREAT SERPL-MCNC: 1.02 MG/DL
EGFR: 88 ML/MIN/1.73M2
EOSINOPHIL # BLD AUTO: 0.14 K/UL
EOSINOPHIL NFR BLD AUTO: 1.3 %
ESTIMATED AVERAGE GLUCOSE: 117 MG/DL
GLUCOSE SERPL-MCNC: 92 MG/DL
HBA1C MFR BLD HPLC: 5.7 %
HCT VFR BLD CALC: 48.5 %
HDLC SERPL-MCNC: 61 MG/DL
HGB BLD-MCNC: 16.3 G/DL
IMM GRANULOCYTES NFR BLD AUTO: 0.4 %
LDLC SERPL CALC-MCNC: 103 MG/DL
LYMPHOCYTES # BLD AUTO: 2.09 K/UL
LYMPHOCYTES NFR BLD AUTO: 18.9 %
MAN DIFF?: NORMAL
MCHC RBC-ENTMCNC: 28.2 PG
MCHC RBC-ENTMCNC: 33.6 GM/DL
MCV RBC AUTO: 83.8 FL
MONOCYTES # BLD AUTO: 0.85 K/UL
MONOCYTES NFR BLD AUTO: 7.7 %
NEUTROPHILS # BLD AUTO: 7.86 K/UL
NEUTROPHILS NFR BLD AUTO: 71.2 %
NONHDLC SERPL-MCNC: 118 MG/DL
PLATELET # BLD AUTO: 244 K/UL
POTASSIUM SERPL-SCNC: 4.3 MMOL/L
PROT SERPL-MCNC: 7.5 G/DL
RBC # BLD: 5.79 M/UL
RBC # FLD: 13 %
SODIUM SERPL-SCNC: 138 MMOL/L
TRIGL SERPL-MCNC: 80 MG/DL
TSH SERPL-ACNC: 2.75 UIU/ML
WBC # FLD AUTO: 11.03 K/UL

## 2024-11-23 ENCOUNTER — RX RENEWAL (OUTPATIENT)
Age: 54
End: 2024-11-23

## 2025-01-21 ENCOUNTER — APPOINTMENT (OUTPATIENT)
Dept: INTERNAL MEDICINE | Facility: CLINIC | Age: 55
End: 2025-01-21
Payer: COMMERCIAL

## 2025-01-21 VITALS
HEIGHT: 68 IN | OXYGEN SATURATION: 95 % | RESPIRATION RATE: 13 BRPM | SYSTOLIC BLOOD PRESSURE: 120 MMHG | HEART RATE: 74 BPM | WEIGHT: 235 LBS | DIASTOLIC BLOOD PRESSURE: 80 MMHG | BODY MASS INDEX: 35.61 KG/M2

## 2025-01-21 DIAGNOSIS — Z79.899 OTHER LONG TERM (CURRENT) DRUG THERAPY: ICD-10-CM

## 2025-01-21 DIAGNOSIS — E11.9 TYPE 2 DIABETES MELLITUS W/OUT COMPLICATIONS: ICD-10-CM

## 2025-01-21 DIAGNOSIS — E78.5 HYPERLIPIDEMIA, UNSPECIFIED: ICD-10-CM

## 2025-01-21 DIAGNOSIS — I10 ESSENTIAL (PRIMARY) HYPERTENSION: ICD-10-CM

## 2025-01-21 DIAGNOSIS — E66.9 OBESITY, UNSPECIFIED: ICD-10-CM

## 2025-01-21 PROCEDURE — G2211 COMPLEX E/M VISIT ADD ON: CPT | Mod: NC

## 2025-01-21 PROCEDURE — 36415 COLL VENOUS BLD VENIPUNCTURE: CPT

## 2025-01-21 PROCEDURE — 99214 OFFICE O/P EST MOD 30 MIN: CPT

## 2025-01-22 ENCOUNTER — NON-APPOINTMENT (OUTPATIENT)
Age: 55
End: 2025-01-22

## 2025-01-22 LAB
ALBUMIN SERPL ELPH-MCNC: 5 G/DL
ALP BLD-CCNC: 115 U/L
ALT SERPL-CCNC: 29 U/L
ANION GAP SERPL CALC-SCNC: 13 MMOL/L
AST SERPL-CCNC: 22 U/L
BASOPHILS # BLD AUTO: 0.05 K/UL
BASOPHILS NFR BLD AUTO: 0.6 %
BILIRUB SERPL-MCNC: 0.4 MG/DL
BUN SERPL-MCNC: 24 MG/DL
CALCIUM SERPL-MCNC: 9.7 MG/DL
CHLORIDE SERPL-SCNC: 102 MMOL/L
CHOLEST SERPL-MCNC: 186 MG/DL
CO2 SERPL-SCNC: 24 MMOL/L
CREAT SERPL-MCNC: 1.05 MG/DL
EGFR: 85 ML/MIN/1.73M2
EOSINOPHIL # BLD AUTO: 0.11 K/UL
EOSINOPHIL NFR BLD AUTO: 1.2 %
ESTIMATED AVERAGE GLUCOSE: 114 MG/DL
GLUCOSE SERPL-MCNC: 90 MG/DL
HBA1C MFR BLD HPLC: 5.6 %
HCT VFR BLD CALC: 45.1 %
HDLC SERPL-MCNC: 58 MG/DL
HGB BLD-MCNC: 15.5 G/DL
IMM GRANULOCYTES NFR BLD AUTO: 0.2 %
LDLC SERPL CALC-MCNC: 109 MG/DL
LYMPHOCYTES # BLD AUTO: 1.97 K/UL
LYMPHOCYTES NFR BLD AUTO: 21.9 %
MAN DIFF?: NORMAL
MCHC RBC-ENTMCNC: 28.2 PG
MCHC RBC-ENTMCNC: 34.4 G/DL
MCV RBC AUTO: 82.1 FL
MONOCYTES # BLD AUTO: 0.67 K/UL
MONOCYTES NFR BLD AUTO: 7.4 %
NEUTROPHILS # BLD AUTO: 6.18 K/UL
NEUTROPHILS NFR BLD AUTO: 68.7 %
NONHDLC SERPL-MCNC: 129 MG/DL
PLATELET # BLD AUTO: 226 K/UL
POTASSIUM SERPL-SCNC: 4.4 MMOL/L
PROT SERPL-MCNC: 7.4 G/DL
RBC # BLD: 5.49 M/UL
RBC # FLD: 13.5 %
SODIUM SERPL-SCNC: 139 MMOL/L
TRIGL SERPL-MCNC: 110 MG/DL
TSH SERPL-ACNC: 2.08 UIU/ML
WBC # FLD AUTO: 9 K/UL

## 2025-01-28 DIAGNOSIS — I25.10 ATHEROSCLEROTIC HEART DISEASE OF NATIVE CORONARY ARTERY W/OUT ANGINA PECTORIS: ICD-10-CM

## 2025-01-28 RX ORDER — ASPIRIN ENTERIC COATED TABLETS 81 MG 81 MG/1
81 TABLET, DELAYED RELEASE ORAL
Qty: 90 | Refills: 1 | Status: ACTIVE | COMMUNITY
Start: 2025-01-28

## 2025-01-28 RX ORDER — ROSUVASTATIN CALCIUM 5 MG/1
5 TABLET, FILM COATED ORAL
Qty: 90 | Refills: 0 | Status: ACTIVE | COMMUNITY
Start: 2025-01-28 | End: 1900-01-01

## 2025-01-30 ENCOUNTER — NON-APPOINTMENT (OUTPATIENT)
Age: 55
End: 2025-01-30

## 2025-01-31 ENCOUNTER — NON-APPOINTMENT (OUTPATIENT)
Age: 55
End: 2025-01-31

## 2025-03-20 ENCOUNTER — NON-APPOINTMENT (OUTPATIENT)
Age: 55
End: 2025-03-20

## 2025-04-17 ENCOUNTER — APPOINTMENT (OUTPATIENT)
Dept: DERMATOLOGY | Facility: CLINIC | Age: 55
End: 2025-04-17

## 2025-05-16 NOTE — PHYSICAL EXAM
Quality 226: Preventive Care And Screening: Tobacco Use: Screening And Cessation Intervention: Patient screened for tobacco use and is an ex/non-smoker Quality 130: Documentation Of Current Medications In The Medical Record: Current Medications Documented Detail Level: Detailed [No Acute Distress] : no acute distress [Well-Appearing] : well-appearing [No Respiratory Distress] : no respiratory distress  [No Accessory Muscle Use] : no accessory muscle use [Clear to Auscultation] : lungs were clear to auscultation bilaterally [Normal Rate] : normal rate  [Regular Rhythm] : with a regular rhythm [No Edema] : there was no peripheral edema [No Focal Deficits] : no focal deficits [Normal Affect] : the affect was normal

## 2025-05-30 ENCOUNTER — RX RENEWAL (OUTPATIENT)
Age: 55
End: 2025-05-30

## 2025-06-05 ENCOUNTER — APPOINTMENT (OUTPATIENT)
Dept: INTERNAL MEDICINE | Facility: CLINIC | Age: 55
End: 2025-06-05
Payer: COMMERCIAL

## 2025-06-05 VITALS
SYSTOLIC BLOOD PRESSURE: 120 MMHG | HEIGHT: 68 IN | DIASTOLIC BLOOD PRESSURE: 70 MMHG | RESPIRATION RATE: 16 BRPM | WEIGHT: 235 LBS | OXYGEN SATURATION: 96 % | BODY MASS INDEX: 35.61 KG/M2 | HEART RATE: 64 BPM

## 2025-06-05 DIAGNOSIS — I25.10 ATHEROSCLEROTIC HEART DISEASE OF NATIVE CORONARY ARTERY W/OUT ANGINA PECTORIS: ICD-10-CM

## 2025-06-05 DIAGNOSIS — E66.9 OBESITY, UNSPECIFIED: ICD-10-CM

## 2025-06-05 DIAGNOSIS — Z79.899 OTHER LONG TERM (CURRENT) DRUG THERAPY: ICD-10-CM

## 2025-06-05 DIAGNOSIS — E78.5 HYPERLIPIDEMIA, UNSPECIFIED: ICD-10-CM

## 2025-06-05 DIAGNOSIS — Z11.59 ENCOUNTER FOR SCREENING FOR OTHER VIRAL DISEASES: ICD-10-CM

## 2025-06-05 DIAGNOSIS — E11.65 TYPE 2 DIABETES MELLITUS WITH HYPERGLYCEMIA: ICD-10-CM

## 2025-06-05 DIAGNOSIS — I10 ESSENTIAL (PRIMARY) HYPERTENSION: ICD-10-CM

## 2025-06-05 PROCEDURE — 36415 COLL VENOUS BLD VENIPUNCTURE: CPT

## 2025-06-05 PROCEDURE — 99214 OFFICE O/P EST MOD 30 MIN: CPT

## 2025-06-05 PROCEDURE — G2211 COMPLEX E/M VISIT ADD ON: CPT | Mod: NC

## 2025-06-06 LAB
ALBUMIN SERPL ELPH-MCNC: 4.5 G/DL
ALP BLD-CCNC: 120 U/L
ALT SERPL-CCNC: 50 U/L
ANION GAP SERPL CALC-SCNC: 15 MMOL/L
AST SERPL-CCNC: 29 U/L
BASOPHILS # BLD AUTO: 0.06 K/UL
BASOPHILS NFR BLD AUTO: 0.7 %
BILIRUB SERPL-MCNC: 0.4 MG/DL
BUN SERPL-MCNC: 20 MG/DL
CALCIUM SERPL-MCNC: 9.6 MG/DL
CHLORIDE SERPL-SCNC: 104 MMOL/L
CHOLEST SERPL-MCNC: 177 MG/DL
CO2 SERPL-SCNC: 21 MMOL/L
CREAT SERPL-MCNC: 0.99 MG/DL
EGFRCR SERPLBLD CKD-EPI 2021: 91 ML/MIN/1.73M2
EOSINOPHIL # BLD AUTO: 0.12 K/UL
EOSINOPHIL NFR BLD AUTO: 1.5 %
ESTIMATED AVERAGE GLUCOSE: 117 MG/DL
GLUCOSE SERPL-MCNC: 94 MG/DL
HBA1C MFR BLD HPLC: 5.7 %
HCT VFR BLD CALC: 47 %
HDLC SERPL-MCNC: 63 MG/DL
HGB BLD-MCNC: 15.7 G/DL
IMM GRANULOCYTES NFR BLD AUTO: 0.2 %
LDLC SERPL-MCNC: 98 MG/DL
LYMPHOCYTES # BLD AUTO: 2.28 K/UL
LYMPHOCYTES NFR BLD AUTO: 27.6 %
MAN DIFF?: NORMAL
MCHC RBC-ENTMCNC: 28.6 PG
MCHC RBC-ENTMCNC: 33.4 G/DL
MCV RBC AUTO: 85.6 FL
MONOCYTES # BLD AUTO: 0.66 K/UL
MONOCYTES NFR BLD AUTO: 8 %
NEUTROPHILS # BLD AUTO: 5.13 K/UL
NEUTROPHILS NFR BLD AUTO: 62 %
NONHDLC SERPL-MCNC: 114 MG/DL
PLATELET # BLD AUTO: 251 K/UL
POTASSIUM SERPL-SCNC: 4.4 MMOL/L
PROT SERPL-MCNC: 7.2 G/DL
RBC # BLD: 5.49 M/UL
RBC # FLD: 13.2 %
SODIUM SERPL-SCNC: 140 MMOL/L
TRIGL SERPL-MCNC: 84 MG/DL
WBC # FLD AUTO: 8.27 K/UL

## 2025-06-08 LAB
HCV AB SER QL: NONREACTIVE
HCV S/CO RATIO: 0.14 S/CO

## 2025-07-16 ENCOUNTER — APPOINTMENT (OUTPATIENT)
Dept: INTERNAL MEDICINE | Facility: CLINIC | Age: 55
End: 2025-07-16
Payer: COMMERCIAL

## 2025-07-16 VITALS
BODY MASS INDEX: 36.68 KG/M2 | DIASTOLIC BLOOD PRESSURE: 78 MMHG | RESPIRATION RATE: 15 BRPM | HEIGHT: 68 IN | SYSTOLIC BLOOD PRESSURE: 114 MMHG | WEIGHT: 242 LBS | OXYGEN SATURATION: 97 % | HEART RATE: 66 BPM

## 2025-07-16 PROCEDURE — 36415 COLL VENOUS BLD VENIPUNCTURE: CPT

## 2025-07-16 PROCEDURE — 99396 PREV VISIT EST AGE 40-64: CPT

## 2025-07-17 LAB
ALBUMIN SERPL ELPH-MCNC: 4.8 G/DL
ALBUMIN, RANDOM URINE: <1.2 MG/DL
ALP BLD-CCNC: 97 U/L
ALT SERPL-CCNC: 43 U/L
ANION GAP SERPL CALC-SCNC: 15 MMOL/L
APPEARANCE: CLEAR
AST SERPL-CCNC: 38 U/L
BACTERIA: NEGATIVE /HPF
BASOPHILS # BLD AUTO: 0.05 K/UL
BASOPHILS NFR BLD AUTO: 0.5 %
BILIRUB SERPL-MCNC: 0.7 MG/DL
BILIRUBIN URINE: NEGATIVE
BLOOD URINE: NEGATIVE
BUN SERPL-MCNC: 18 MG/DL
CALCIUM SERPL-MCNC: 9.7 MG/DL
CAST: 0 /LPF
CHLORIDE SERPL-SCNC: 102 MMOL/L
CHOLEST SERPL-MCNC: 150 MG/DL
CO2 SERPL-SCNC: 22 MMOL/L
COLOR: YELLOW
CREAT SERPL-MCNC: 1 MG/DL
CREAT SPEC-SCNC: 164 MG/DL
EGFRCR SERPLBLD CKD-EPI 2021: 89 ML/MIN/1.73M2
EOSINOPHIL # BLD AUTO: 0.18 K/UL
EOSINOPHIL NFR BLD AUTO: 1.7 %
EPITHELIAL CELLS: 0 /HPF
ESTIMATED AVERAGE GLUCOSE: 131 MG/DL
GLUCOSE QUALITATIVE U: NEGATIVE MG/DL
GLUCOSE SERPL-MCNC: 93 MG/DL
HBA1C MFR BLD HPLC: 6.2 %
HCT VFR BLD CALC: 45.7 %
HDLC SERPL-MCNC: 64 MG/DL
HGB BLD-MCNC: 15.4 G/DL
IMM GRANULOCYTES NFR BLD AUTO: 0.3 %
KETONES URINE: 15 MG/DL
LDLC SERPL-MCNC: 71 MG/DL
LEUKOCYTE ESTERASE URINE: NEGATIVE
LYMPHOCYTES # BLD AUTO: 2.42 K/UL
LYMPHOCYTES NFR BLD AUTO: 22.9 %
MAN DIFF?: NORMAL
MCHC RBC-ENTMCNC: 28.9 PG
MCHC RBC-ENTMCNC: 33.7 G/DL
MCV RBC AUTO: 85.7 FL
MICROALBUMIN/CREAT 24H UR-RTO: NORMAL MG/G
MICROSCOPIC-UA: NORMAL
MONOCYTES # BLD AUTO: 0.78 K/UL
MONOCYTES NFR BLD AUTO: 7.4 %
NEUTROPHILS # BLD AUTO: 7.1 K/UL
NEUTROPHILS NFR BLD AUTO: 67.2 %
NITRITE URINE: NEGATIVE
NONHDLC SERPL-MCNC: 86 MG/DL
PH URINE: 5.5
PLATELET # BLD AUTO: 222 K/UL
POTASSIUM SERPL-SCNC: 4 MMOL/L
PROT SERPL-MCNC: 7.5 G/DL
PROTEIN URINE: NEGATIVE MG/DL
RBC # BLD: 5.33 M/UL
RBC # FLD: 13.9 %
RED BLOOD CELLS URINE: 0 /HPF
SODIUM SERPL-SCNC: 138 MMOL/L
SPECIFIC GRAVITY URINE: 1.02
TRIGL SERPL-MCNC: 75 MG/DL
UROBILINOGEN URINE: 0.2 MG/DL
WBC # FLD AUTO: 10.56 K/UL
WHITE BLOOD CELLS URINE: 0 /HPF

## 2025-08-30 ENCOUNTER — RX RENEWAL (OUTPATIENT)
Age: 55
End: 2025-08-30